# Patient Record
Sex: FEMALE | Race: ASIAN | NOT HISPANIC OR LATINO | Employment: FULL TIME | URBAN - METROPOLITAN AREA
[De-identification: names, ages, dates, MRNs, and addresses within clinical notes are randomized per-mention and may not be internally consistent; named-entity substitution may affect disease eponyms.]

---

## 2017-02-20 ENCOUNTER — OFFICE VISIT - HEALTHEAST (OUTPATIENT)
Dept: FAMILY MEDICINE | Facility: CLINIC | Age: 22
End: 2017-02-20

## 2017-02-20 DIAGNOSIS — E89.0 OTHER POSTABLATIVE HYPOTHYROIDISM: ICD-10-CM

## 2017-02-20 DIAGNOSIS — E05.00 TOXIC DIFFUSE GOITER WITHOUT MENTION OF THYROTOXIC CRISIS OR STORM: ICD-10-CM

## 2017-03-23 ENCOUNTER — AMBULATORY - HEALTHEAST (OUTPATIENT)
Dept: LAB | Facility: CLINIC | Age: 22
End: 2017-03-23

## 2017-03-23 DIAGNOSIS — E89.0 OTHER POSTABLATIVE HYPOTHYROIDISM: ICD-10-CM

## 2017-03-30 ENCOUNTER — COMMUNICATION - HEALTHEAST (OUTPATIENT)
Dept: FAMILY MEDICINE | Facility: CLINIC | Age: 22
End: 2017-03-30

## 2017-03-30 DIAGNOSIS — E03.9 HYPOTHYROIDISM: ICD-10-CM

## 2017-03-30 DIAGNOSIS — E89.0 OTHER POSTABLATIVE HYPOTHYROIDISM: ICD-10-CM

## 2017-07-04 ENCOUNTER — COMMUNICATION - HEALTHEAST (OUTPATIENT)
Dept: FAMILY MEDICINE | Facility: CLINIC | Age: 22
End: 2017-07-04

## 2017-07-04 DIAGNOSIS — E89.0 OTHER POSTABLATIVE HYPOTHYROIDISM: ICD-10-CM

## 2017-09-29 ENCOUNTER — COMMUNICATION - HEALTHEAST (OUTPATIENT)
Dept: FAMILY MEDICINE | Facility: CLINIC | Age: 22
End: 2017-09-29

## 2017-09-29 DIAGNOSIS — E03.9 HYPOTHYROID: ICD-10-CM

## 2017-11-11 ENCOUNTER — COMMUNICATION - HEALTHEAST (OUTPATIENT)
Dept: FAMILY MEDICINE | Facility: CLINIC | Age: 22
End: 2017-11-11

## 2017-11-11 DIAGNOSIS — E03.9 HYPOTHYROID: ICD-10-CM

## 2017-12-02 ENCOUNTER — COMMUNICATION - HEALTHEAST (OUTPATIENT)
Dept: FAMILY MEDICINE | Facility: CLINIC | Age: 22
End: 2017-12-02

## 2017-12-02 DIAGNOSIS — E03.9 HYPOTHYROID: ICD-10-CM

## 2018-02-02 ENCOUNTER — COMMUNICATION - HEALTHEAST (OUTPATIENT)
Dept: FAMILY MEDICINE | Facility: CLINIC | Age: 23
End: 2018-02-02

## 2018-02-02 DIAGNOSIS — E03.9 HYPOTHYROID: ICD-10-CM

## 2018-05-11 ENCOUNTER — COMMUNICATION - HEALTHEAST (OUTPATIENT)
Dept: FAMILY MEDICINE | Facility: CLINIC | Age: 23
End: 2018-05-11

## 2018-05-11 DIAGNOSIS — E03.9 HYPOTHYROID: ICD-10-CM

## 2018-05-15 ENCOUNTER — COMMUNICATION - HEALTHEAST (OUTPATIENT)
Dept: FAMILY MEDICINE | Facility: CLINIC | Age: 23
End: 2018-05-15

## 2018-05-15 DIAGNOSIS — E03.9 HYPOTHYROID: ICD-10-CM

## 2018-06-11 ENCOUNTER — COMMUNICATION - HEALTHEAST (OUTPATIENT)
Dept: FAMILY MEDICINE | Facility: CLINIC | Age: 23
End: 2018-06-11

## 2018-06-11 DIAGNOSIS — E03.9 HYPOTHYROID: ICD-10-CM

## 2018-07-10 ENCOUNTER — COMMUNICATION - HEALTHEAST (OUTPATIENT)
Dept: FAMILY MEDICINE | Facility: CLINIC | Age: 23
End: 2018-07-10

## 2018-07-10 DIAGNOSIS — E03.9 HYPOTHYROID: ICD-10-CM

## 2018-07-23 ENCOUNTER — OFFICE VISIT - HEALTHEAST (OUTPATIENT)
Dept: FAMILY MEDICINE | Facility: CLINIC | Age: 23
End: 2018-07-23

## 2018-07-23 DIAGNOSIS — E03.9 HYPOTHYROIDISM: ICD-10-CM

## 2018-07-23 LAB
T4 FREE SERPL-MCNC: 0.8 NG/DL (ref 0.7–1.8)
TSH SERPL DL<=0.005 MIU/L-ACNC: 27.2 UIU/ML (ref 0.3–5)

## 2018-07-25 ENCOUNTER — COMMUNICATION - HEALTHEAST (OUTPATIENT)
Dept: FAMILY MEDICINE | Facility: CLINIC | Age: 23
End: 2018-07-25

## 2018-07-25 ENCOUNTER — AMBULATORY - HEALTHEAST (OUTPATIENT)
Dept: FAMILY MEDICINE | Facility: CLINIC | Age: 23
End: 2018-07-25

## 2018-07-25 DIAGNOSIS — E03.0 CONGENITAL HYPOTHYROIDISM WITH DIFFUSE GOITER: ICD-10-CM

## 2018-07-25 DIAGNOSIS — E03.9 HYPOTHYROIDISM: ICD-10-CM

## 2018-07-25 DIAGNOSIS — E03.9 HYPOTHYROID: ICD-10-CM

## 2018-10-20 ENCOUNTER — COMMUNICATION - HEALTHEAST (OUTPATIENT)
Dept: FAMILY MEDICINE | Facility: CLINIC | Age: 23
End: 2018-10-20

## 2018-10-20 DIAGNOSIS — E03.9 HYPOTHYROIDISM: ICD-10-CM

## 2018-12-02 ENCOUNTER — COMMUNICATION - HEALTHEAST (OUTPATIENT)
Dept: FAMILY MEDICINE | Facility: CLINIC | Age: 23
End: 2018-12-02

## 2019-01-02 ENCOUNTER — COMMUNICATION - HEALTHEAST (OUTPATIENT)
Dept: FAMILY MEDICINE | Facility: CLINIC | Age: 24
End: 2019-01-02

## 2019-01-16 ENCOUNTER — COMMUNICATION - HEALTHEAST (OUTPATIENT)
Dept: FAMILY MEDICINE | Facility: CLINIC | Age: 24
End: 2019-01-16

## 2019-01-21 ENCOUNTER — OFFICE VISIT - HEALTHEAST (OUTPATIENT)
Dept: FAMILY MEDICINE | Facility: CLINIC | Age: 24
End: 2019-01-21

## 2019-01-21 DIAGNOSIS — E03.9 ACQUIRED HYPOTHYROIDISM: ICD-10-CM

## 2019-01-21 DIAGNOSIS — Z23 ENCOUNTER FOR ADMINISTRATION OF VACCINE: ICD-10-CM

## 2019-01-21 LAB
T4 FREE SERPL-MCNC: 0.9 NG/DL (ref 0.7–1.8)
TSH SERPL DL<=0.005 MIU/L-ACNC: 3.65 UIU/ML (ref 0.3–5)

## 2019-02-05 ENCOUNTER — COMMUNICATION - HEALTHEAST (OUTPATIENT)
Dept: SCHEDULING | Facility: CLINIC | Age: 24
End: 2019-02-05

## 2019-02-06 ENCOUNTER — OFFICE VISIT - HEALTHEAST (OUTPATIENT)
Dept: FAMILY MEDICINE | Facility: CLINIC | Age: 24
End: 2019-02-06

## 2019-02-06 DIAGNOSIS — M62.838 MUSCLE SPASM: ICD-10-CM

## 2019-02-23 ENCOUNTER — COMMUNICATION - HEALTHEAST (OUTPATIENT)
Dept: FAMILY MEDICINE | Facility: CLINIC | Age: 24
End: 2019-02-23

## 2019-02-23 DIAGNOSIS — M62.838 MUSCLE SPASM: ICD-10-CM

## 2019-07-08 ENCOUNTER — OFFICE VISIT - HEALTHEAST (OUTPATIENT)
Dept: FAMILY MEDICINE | Facility: CLINIC | Age: 24
End: 2019-07-08

## 2019-07-08 DIAGNOSIS — T07.XXXA MULTIPLE ABRASIONS: ICD-10-CM

## 2019-07-08 DIAGNOSIS — M25.572 PAIN IN JOINT INVOLVING ANKLE AND FOOT, LEFT: ICD-10-CM

## 2019-07-08 DIAGNOSIS — S93.402A SPRAIN OF LEFT ANKLE, UNSPECIFIED LIGAMENT, INITIAL ENCOUNTER: ICD-10-CM

## 2019-07-24 ENCOUNTER — COMMUNICATION - HEALTHEAST (OUTPATIENT)
Dept: FAMILY MEDICINE | Facility: CLINIC | Age: 24
End: 2019-07-24

## 2019-12-24 ENCOUNTER — COMMUNICATION - HEALTHEAST (OUTPATIENT)
Dept: FAMILY MEDICINE | Facility: CLINIC | Age: 24
End: 2019-12-24

## 2020-01-03 ENCOUNTER — OFFICE VISIT - HEALTHEAST (OUTPATIENT)
Dept: FAMILY MEDICINE | Facility: CLINIC | Age: 25
End: 2020-01-03

## 2020-01-03 DIAGNOSIS — N91.2 AMENORRHEA: ICD-10-CM

## 2020-01-03 DIAGNOSIS — Z86.39 HX OF GRAVES' DISEASE: ICD-10-CM

## 2020-01-03 DIAGNOSIS — Z32.01 PREGNANCY TEST POSITIVE: ICD-10-CM

## 2020-01-03 DIAGNOSIS — E89.0 POSTPROCEDURAL HYPOTHYROIDISM: ICD-10-CM

## 2020-01-03 LAB
HCG UR QL: POSITIVE
T4 FREE SERPL-MCNC: 1.3 NG/DL (ref 0.7–1.8)
TSH SERPL DL<=0.005 MIU/L-ACNC: 1.29 UIU/ML (ref 0.3–5)

## 2020-01-06 LAB
C TRACH DNA SPEC QL PROBE+SIG AMP: NEGATIVE
N GONORRHOEA DNA SPEC QL NAA+PROBE: NEGATIVE

## 2020-01-07 LAB — THYROID PEROXIDASE ANTIBODIES - HISTORICAL: 11.5 IU/ML (ref 0–5.6)

## 2020-01-09 ENCOUNTER — COMMUNICATION - HEALTHEAST (OUTPATIENT)
Dept: FAMILY MEDICINE | Facility: CLINIC | Age: 25
End: 2020-01-09

## 2020-01-09 DIAGNOSIS — Z32.01 PREGNANCY TEST POSITIVE: ICD-10-CM

## 2020-01-10 ENCOUNTER — HOSPITAL ENCOUNTER (OUTPATIENT)
Dept: ULTRASOUND IMAGING | Facility: CLINIC | Age: 25
Discharge: HOME | End: 2020-01-10
Attending: FAMILY MEDICINE

## 2020-01-27 ENCOUNTER — COMMUNICATION - HEALTHEAST (OUTPATIENT)
Dept: FAMILY MEDICINE | Facility: CLINIC | Age: 25
End: 2020-01-27

## 2020-01-27 DIAGNOSIS — E03.9 ACQUIRED HYPOTHYROIDISM: ICD-10-CM

## 2020-02-19 ENCOUNTER — COMMUNICATION - HEALTHEAST (OUTPATIENT)
Dept: FAMILY MEDICINE | Facility: CLINIC | Age: 25
End: 2020-02-19

## 2020-03-09 ENCOUNTER — PRENATAL OFFICE VISIT - HEALTHEAST (OUTPATIENT)
Dept: FAMILY MEDICINE | Facility: CLINIC | Age: 25
End: 2020-03-09

## 2020-03-09 DIAGNOSIS — Z12.4 SCREENING FOR CERVICAL CANCER: ICD-10-CM

## 2020-03-09 DIAGNOSIS — E89.0 POSTPROCEDURAL HYPOTHYROIDISM: ICD-10-CM

## 2020-03-09 DIAGNOSIS — Z86.39 HX OF GRAVES' DISEASE: ICD-10-CM

## 2020-03-09 DIAGNOSIS — Z34.82 ENCOUNTER FOR SUPERVISION OF OTHER NORMAL PREGNANCY IN SECOND TRIMESTER: ICD-10-CM

## 2020-03-09 LAB
ALBUMIN UR-MCNC: NEGATIVE MG/DL
AMPHETAMINES UR QL SCN: NORMAL
APPEARANCE UR: CLEAR
BARBITURATES UR QL: NORMAL
BASOPHILS # BLD AUTO: 0.1 THOU/UL (ref 0–0.2)
BASOPHILS NFR BLD AUTO: 1 % (ref 0–2)
BENZODIAZ UR QL: NORMAL
BILIRUB UR QL STRIP: NEGATIVE
CANNABINOIDS UR QL SCN: NORMAL
COCAINE UR QL: NORMAL
COLOR UR AUTO: YELLOW
CREAT UR-MCNC: 36.6 MG/DL
EOSINOPHIL # BLD AUTO: 0.3 THOU/UL (ref 0–0.4)
EOSINOPHIL NFR BLD AUTO: 2 % (ref 0–6)
ERYTHROCYTE [DISTWIDTH] IN BLOOD BY AUTOMATED COUNT: 14.3 % (ref 11–14.5)
GLUCOSE UR STRIP-MCNC: NEGATIVE MG/DL
HCT VFR BLD AUTO: 39 % (ref 35–47)
HGB BLD-MCNC: 12.7 G/DL (ref 12–16)
HGB UR QL STRIP: ABNORMAL
HIV 1+2 AB+HIV1 P24 AG SERPL QL IA: NEGATIVE
KETONES UR STRIP-MCNC: NEGATIVE MG/DL
LEUKOCYTE ESTERASE UR QL STRIP: NEGATIVE
LYMPHOCYTES # BLD AUTO: 1.2 THOU/UL (ref 0.8–4.4)
LYMPHOCYTES NFR BLD AUTO: 10 % (ref 20–40)
MCH RBC QN AUTO: 30.5 PG (ref 27–34)
MCHC RBC AUTO-ENTMCNC: 32.6 G/DL (ref 32–36)
MCV RBC AUTO: 94 FL (ref 80–100)
MONOCYTES # BLD AUTO: 0.8 THOU/UL (ref 0–0.9)
MONOCYTES NFR BLD AUTO: 6 % (ref 2–10)
NEUTROPHILS # BLD AUTO: 9.8 THOU/UL (ref 2–7.7)
NEUTROPHILS NFR BLD AUTO: 79 % (ref 50–70)
NITRATE UR QL: NEGATIVE
OPIATES UR QL SCN: NORMAL
OXYCODONE UR QL: NORMAL
PCP UR QL SCN: NORMAL
PH UR STRIP: 7 [PH] (ref 5–8)
PLATELET # BLD AUTO: 259 THOU/UL (ref 140–440)
PMV BLD AUTO: 10.8 FL (ref 8.5–12.5)
RBC # BLD AUTO: 4.16 MILL/UL (ref 3.8–5.4)
SP GR UR STRIP: 1.01 (ref 1–1.03)
T4 FREE SERPL-MCNC: 0.9 NG/DL (ref 0.7–1.8)
TSH SERPL DL<=0.005 MIU/L-ACNC: 17.6 UIU/ML (ref 0.3–5)
UROBILINOGEN UR STRIP-ACNC: ABNORMAL
WBC: 12.4 THOU/UL (ref 4–11)

## 2020-03-10 ENCOUNTER — HOSPITAL ENCOUNTER (OUTPATIENT)
Dept: ULTRASOUND IMAGING | Facility: HOSPITAL | Age: 25
Discharge: HOME | End: 2020-03-10
Attending: FAMILY MEDICINE

## 2020-03-10 ENCOUNTER — AMBULATORY - HEALTHEAST (OUTPATIENT)
Dept: FAMILY MEDICINE | Facility: CLINIC | Age: 25
End: 2020-03-10

## 2020-03-10 DIAGNOSIS — Z34.82 ENCOUNTER FOR SUPERVISION OF OTHER NORMAL PREGNANCY IN SECOND TRIMESTER: ICD-10-CM

## 2020-03-10 DIAGNOSIS — E03.9 ACQUIRED HYPOTHYROIDISM: ICD-10-CM

## 2020-03-10 LAB
ABO/RH(D): NORMAL
ABORH REPEAT: NORMAL
ANTIBODY SCREEN: NEGATIVE
BACTERIA SPEC CULT: NO GROWTH
HBV SURFACE AG SERPL QL IA: NEGATIVE
RUBV IGG SERPL QL IA: POSITIVE
T PALLIDUM AB SER QL: NEGATIVE

## 2020-03-11 ENCOUNTER — COMMUNICATION - HEALTHEAST (OUTPATIENT)
Dept: FAMILY MEDICINE | Facility: CLINIC | Age: 25
End: 2020-03-11

## 2020-03-12 ENCOUNTER — COMMUNICATION - HEALTHEAST (OUTPATIENT)
Dept: FAMILY MEDICINE | Facility: CLINIC | Age: 25
End: 2020-03-12

## 2020-03-12 LAB
BKR LAB AP ABNORMAL BLEEDING: NO
BKR LAB AP BIRTH CONTROL/HORMONES: ABNORMAL
BKR LAB AP CERVICAL APPEARANCE: ABNORMAL
BKR LAB AP GYN ADEQUACY: ABNORMAL
BKR LAB AP GYN INTERPRETATION: ABNORMAL
BKR LAB AP HPV REFLEX: ABNORMAL
BKR LAB AP LMP: ABNORMAL
BKR LAB AP PATIENT STATUS: ABNORMAL
BKR LAB AP PREVIOUS ABNORMAL: ABNORMAL
BKR LAB AP PREVIOUS NORMAL: 2010
HIGH RISK?: NO
HPV SOURCE: NORMAL
HUMAN PAPILLOMA VIRUS 16 DNA: NEGATIVE
HUMAN PAPILLOMA VIRUS 18 DNA: NEGATIVE
HUMAN PAPILLOMA VIRUS FINAL DIAGNOSIS: NORMAL
HUMAN PAPILLOMA VIRUS OTHER HR: NEGATIVE
PATH REPORT.COMMENTS IMP SPEC: ABNORMAL
RESULT FLAG (HE HISTORICAL CONVERSION): ABNORMAL
SPECIMEN DESCRIPTION: NORMAL

## 2020-03-17 ENCOUNTER — COMMUNICATION - HEALTHEAST (OUTPATIENT)
Dept: FAMILY MEDICINE | Facility: CLINIC | Age: 25
End: 2020-03-17

## 2020-03-18 ENCOUNTER — COMMUNICATION - HEALTHEAST (OUTPATIENT)
Dept: FAMILY MEDICINE | Facility: CLINIC | Age: 25
End: 2020-03-18

## 2020-03-21 ENCOUNTER — AMBULATORY - HEALTHEAST (OUTPATIENT)
Dept: FAMILY MEDICINE | Facility: CLINIC | Age: 25
End: 2020-03-21

## 2020-03-21 DIAGNOSIS — Z34.82 ENCOUNTER FOR SUPERVISION OF OTHER NORMAL PREGNANCY IN SECOND TRIMESTER: ICD-10-CM

## 2020-03-23 ENCOUNTER — COMMUNICATION - HEALTHEAST (OUTPATIENT)
Dept: FAMILY MEDICINE | Facility: CLINIC | Age: 25
End: 2020-03-23

## 2020-03-26 ENCOUNTER — COMMUNICATION - HEALTHEAST (OUTPATIENT)
Dept: FAMILY MEDICINE | Facility: CLINIC | Age: 25
End: 2020-03-26

## 2020-03-30 ENCOUNTER — COMMUNICATION - HEALTHEAST (OUTPATIENT)
Dept: FAMILY MEDICINE | Facility: CLINIC | Age: 25
End: 2020-03-30

## 2020-03-30 DIAGNOSIS — E03.9 ACQUIRED HYPOTHYROIDISM: ICD-10-CM

## 2020-04-02 ENCOUNTER — HOSPITAL ENCOUNTER (OUTPATIENT)
Dept: ULTRASOUND IMAGING | Facility: HOSPITAL | Age: 25
Discharge: HOME | End: 2020-04-02
Attending: FAMILY MEDICINE

## 2020-04-02 DIAGNOSIS — Z34.82 ENCOUNTER FOR SUPERVISION OF OTHER NORMAL PREGNANCY IN SECOND TRIMESTER: ICD-10-CM

## 2020-04-09 ENCOUNTER — PRENATAL OFFICE VISIT - HEALTHEAST (OUTPATIENT)
Dept: FAMILY MEDICINE | Facility: CLINIC | Age: 25
End: 2020-04-09

## 2020-04-09 ENCOUNTER — COMMUNICATION - HEALTHEAST (OUTPATIENT)
Dept: FAMILY MEDICINE | Facility: CLINIC | Age: 25
End: 2020-04-09

## 2020-04-09 DIAGNOSIS — E89.0 POSTPROCEDURAL HYPOTHYROIDISM: ICD-10-CM

## 2020-04-09 DIAGNOSIS — R87.612 LOW GRADE SQUAMOUS INTRAEPITH LESION ON CYTOLOGIC SMEAR CERVIX (LGSIL): ICD-10-CM

## 2020-04-09 DIAGNOSIS — Z34.80 SUPERVISION OF OTHER NORMAL PREGNANCY, ANTEPARTUM: ICD-10-CM

## 2020-04-10 ENCOUNTER — COMMUNICATION - HEALTHEAST (OUTPATIENT)
Dept: FAMILY MEDICINE | Facility: CLINIC | Age: 25
End: 2020-04-10

## 2020-04-20 ENCOUNTER — COMMUNICATION - HEALTHEAST (OUTPATIENT)
Dept: FAMILY MEDICINE | Facility: CLINIC | Age: 25
End: 2020-04-20

## 2020-04-21 ENCOUNTER — COMMUNICATION - HEALTHEAST (OUTPATIENT)
Dept: FAMILY MEDICINE | Facility: CLINIC | Age: 25
End: 2020-04-21

## 2020-04-21 DIAGNOSIS — E03.9 ACQUIRED HYPOTHYROIDISM: ICD-10-CM

## 2020-05-12 ENCOUNTER — PRENATAL OFFICE VISIT - HEALTHEAST (OUTPATIENT)
Dept: FAMILY MEDICINE | Facility: CLINIC | Age: 25
End: 2020-05-12

## 2020-05-12 DIAGNOSIS — E03.9 ACQUIRED HYPOTHYROIDISM: ICD-10-CM

## 2020-05-12 DIAGNOSIS — E89.0 POSTPROCEDURAL HYPOTHYROIDISM: ICD-10-CM

## 2020-05-12 DIAGNOSIS — Z34.80 SUPERVISION OF OTHER NORMAL PREGNANCY, ANTEPARTUM: ICD-10-CM

## 2020-05-12 LAB
ERYTHROCYTE [DISTWIDTH] IN BLOOD BY AUTOMATED COUNT: 10.7 % (ref 11–14.5)
FASTING STATUS PATIENT QL REPORTED: NORMAL
GLUCOSE 1H P 50 G GLC PO SERPL-MCNC: 101 MG/DL (ref 70–139)
HCT VFR BLD AUTO: 36.6 % (ref 35–47)
HGB BLD-MCNC: 12.2 G/DL (ref 12–16)
MCH RBC QN AUTO: 32.1 PG (ref 27–34)
MCHC RBC AUTO-ENTMCNC: 33.3 G/DL (ref 32–36)
MCV RBC AUTO: 96 FL (ref 80–100)
PLATELET # BLD AUTO: 264 THOU/UL (ref 140–440)
PMV BLD AUTO: 7.6 FL (ref 7–10)
RBC # BLD AUTO: 3.8 MILL/UL (ref 3.8–5.4)
TSH SERPL DL<=0.005 MIU/L-ACNC: 0.99 UIU/ML (ref 0.3–5)
WBC: 14.4 THOU/UL (ref 4–11)

## 2020-05-13 LAB — T PALLIDUM AB SER QL: NEGATIVE

## 2020-05-20 ENCOUNTER — COMMUNICATION - HEALTHEAST (OUTPATIENT)
Dept: FAMILY MEDICINE | Facility: CLINIC | Age: 25
End: 2020-05-20

## 2020-05-20 DIAGNOSIS — E03.9 ACQUIRED HYPOTHYROIDISM: ICD-10-CM

## 2020-05-28 ENCOUNTER — COMMUNICATION - HEALTHEAST (OUTPATIENT)
Dept: FAMILY MEDICINE | Facility: CLINIC | Age: 25
End: 2020-05-28

## 2020-05-28 ENCOUNTER — OFFICE VISIT - HEALTHEAST (OUTPATIENT)
Dept: FAMILY MEDICINE | Facility: CLINIC | Age: 25
End: 2020-05-28

## 2020-05-28 DIAGNOSIS — E03.9 ACQUIRED HYPOTHYROIDISM: ICD-10-CM

## 2020-05-28 DIAGNOSIS — R87.612 LOW GRADE SQUAMOUS INTRAEPITH LESION ON CYTOLOGIC SMEAR CERVIX (LGSIL): ICD-10-CM

## 2020-05-28 DIAGNOSIS — Z34.83 ENCOUNTER FOR SUPERVISION OF OTHER NORMAL PREGNANCY IN THIRD TRIMESTER: ICD-10-CM

## 2020-05-28 DIAGNOSIS — E89.0 POSTPROCEDURAL HYPOTHYROIDISM: ICD-10-CM

## 2020-06-11 ENCOUNTER — PRENATAL OFFICE VISIT - HEALTHEAST (OUTPATIENT)
Dept: FAMILY MEDICINE | Facility: CLINIC | Age: 25
End: 2020-06-11

## 2020-06-11 DIAGNOSIS — Z34.83 ENCOUNTER FOR SUPERVISION OF OTHER NORMAL PREGNANCY IN THIRD TRIMESTER: ICD-10-CM

## 2020-06-11 DIAGNOSIS — E03.9 ACQUIRED HYPOTHYROIDISM: ICD-10-CM

## 2020-06-11 LAB — TSH SERPL DL<=0.005 MIU/L-ACNC: 1.82 UIU/ML (ref 0.3–5)

## 2020-06-17 ENCOUNTER — HOSPITAL ENCOUNTER (OUTPATIENT)
Dept: ULTRASOUND IMAGING | Facility: HOSPITAL | Age: 25
Discharge: HOME | End: 2020-06-17
Attending: FAMILY MEDICINE

## 2020-06-17 DIAGNOSIS — E03.9 ACQUIRED HYPOTHYROIDISM: ICD-10-CM

## 2020-06-17 DIAGNOSIS — Z34.83 ENCOUNTER FOR SUPERVISION OF OTHER NORMAL PREGNANCY IN THIRD TRIMESTER: ICD-10-CM

## 2020-06-21 ENCOUNTER — COMMUNICATION - HEALTHEAST (OUTPATIENT)
Dept: FAMILY MEDICINE | Facility: CLINIC | Age: 25
End: 2020-06-21

## 2020-06-21 DIAGNOSIS — E03.9 ACQUIRED HYPOTHYROIDISM: ICD-10-CM

## 2020-06-22 RX ORDER — LEVOTHYROXINE SODIUM 125 UG/1
TABLET ORAL
Qty: 90 TABLET | Refills: 3 | Status: SHIPPED | OUTPATIENT
Start: 2020-06-22 | End: 2022-05-31

## 2020-06-25 ENCOUNTER — PRENATAL OFFICE VISIT - HEALTHEAST (OUTPATIENT)
Dept: FAMILY MEDICINE | Facility: CLINIC | Age: 25
End: 2020-06-25

## 2020-06-25 DIAGNOSIS — Z34.80 SUPERVISION OF OTHER NORMAL PREGNANCY, ANTEPARTUM: ICD-10-CM

## 2020-06-25 DIAGNOSIS — E89.0 POSTPROCEDURAL HYPOTHYROIDISM: ICD-10-CM

## 2020-07-02 ENCOUNTER — PRENATAL OFFICE VISIT - HEALTHEAST (OUTPATIENT)
Dept: FAMILY MEDICINE | Facility: CLINIC | Age: 25
End: 2020-07-02

## 2020-07-02 DIAGNOSIS — Z34.80 SUPERVISION OF OTHER NORMAL PREGNANCY, ANTEPARTUM: ICD-10-CM

## 2020-07-03 LAB
ALLERGIC TO PENICILLIN: NO
GP B STREP DNA SPEC QL NAA+PROBE: POSITIVE

## 2020-07-16 ENCOUNTER — PRENATAL OFFICE VISIT - HEALTHEAST (OUTPATIENT)
Dept: FAMILY MEDICINE | Facility: CLINIC | Age: 25
End: 2020-07-16

## 2020-07-16 DIAGNOSIS — Z34.80 SUPERVISION OF OTHER NORMAL PREGNANCY, ANTEPARTUM: ICD-10-CM

## 2020-07-16 DIAGNOSIS — B95.1 POSITIVE GBS TEST: ICD-10-CM

## 2020-07-16 DIAGNOSIS — E89.0 POSTPROCEDURAL HYPOTHYROIDISM: ICD-10-CM

## 2020-07-16 LAB — TSH SERPL DL<=0.005 MIU/L-ACNC: 0.77 UIU/ML (ref 0.3–5)

## 2020-07-21 ENCOUNTER — HOSPITAL ENCOUNTER (OUTPATIENT)
Dept: ULTRASOUND IMAGING | Facility: HOSPITAL | Age: 25
Discharge: HOME | End: 2020-07-21
Attending: FAMILY MEDICINE

## 2020-07-21 DIAGNOSIS — Z34.80 SUPERVISION OF OTHER NORMAL PREGNANCY, ANTEPARTUM: ICD-10-CM

## 2020-07-21 DIAGNOSIS — E89.0 POSTPROCEDURAL HYPOTHYROIDISM: ICD-10-CM

## 2020-07-23 ENCOUNTER — PRENATAL OFFICE VISIT - HEALTHEAST (OUTPATIENT)
Dept: FAMILY MEDICINE | Facility: CLINIC | Age: 25
End: 2020-07-23

## 2020-07-23 DIAGNOSIS — Z34.80 SUPERVISION OF OTHER NORMAL PREGNANCY, ANTEPARTUM: ICD-10-CM

## 2020-07-23 DIAGNOSIS — E89.0 POSTPROCEDURAL HYPOTHYROIDISM: ICD-10-CM

## 2020-07-23 DIAGNOSIS — B95.1 POSITIVE GBS TEST: ICD-10-CM

## 2020-07-24 ENCOUNTER — COMMUNICATION - HEALTHEAST (OUTPATIENT)
Dept: FAMILY MEDICINE | Facility: CLINIC | Age: 25
End: 2020-07-24

## 2020-07-30 ENCOUNTER — PRENATAL OFFICE VISIT - HEALTHEAST (OUTPATIENT)
Dept: FAMILY MEDICINE | Facility: CLINIC | Age: 25
End: 2020-07-30

## 2020-07-30 DIAGNOSIS — E89.0 POSTPROCEDURAL HYPOTHYROIDISM: ICD-10-CM

## 2020-07-30 DIAGNOSIS — B95.1 POSITIVE GBS TEST: ICD-10-CM

## 2020-07-30 DIAGNOSIS — Z34.83 ENCOUNTER FOR SUPERVISION OF OTHER NORMAL PREGNANCY IN THIRD TRIMESTER: ICD-10-CM

## 2020-08-03 ENCOUNTER — ANESTHESIA - HEALTHEAST (OUTPATIENT)
Dept: OBGYN | Facility: CLINIC | Age: 25
End: 2020-08-03

## 2020-08-03 ENCOUNTER — COMMUNICATION - HEALTHEAST (OUTPATIENT)
Dept: SCHEDULING | Facility: CLINIC | Age: 25
End: 2020-08-03

## 2020-08-06 ENCOUNTER — COMMUNICATION - HEALTHEAST (OUTPATIENT)
Dept: SCHEDULING | Facility: CLINIC | Age: 25
End: 2020-08-06

## 2020-09-16 ENCOUNTER — COMMUNICATION - HEALTHEAST (OUTPATIENT)
Dept: FAMILY MEDICINE | Facility: CLINIC | Age: 25
End: 2020-09-16

## 2021-05-07 ENCOUNTER — AMBULATORY - HEALTHEAST (OUTPATIENT)
Dept: NURSING | Facility: CLINIC | Age: 26
End: 2021-05-07

## 2021-05-27 ENCOUNTER — RECORDS - HEALTHEAST (OUTPATIENT)
Dept: ADMINISTRATIVE | Facility: CLINIC | Age: 26
End: 2021-05-27

## 2021-05-28 ENCOUNTER — AMBULATORY - HEALTHEAST (OUTPATIENT)
Dept: NURSING | Facility: CLINIC | Age: 26
End: 2021-05-28

## 2021-05-30 VITALS — BODY MASS INDEX: 30.67 KG/M2 | WEIGHT: 165 LBS

## 2021-05-30 NOTE — TELEPHONE ENCOUNTER
Name of form/paperwork: [place]  Have you been seen for this request: No Patient was unable to see Dr. Abdi on 7/8/19 so she was seen in [place] [facility].  Do we have the form: Yes- faxed to [phone], today.  When is form needed by: Today, if possible  How would you like the form returned: Please fax back to patient's employer.  Fax Number: [phone]  Patient Notified form requests are processed in 3-5 business days: Yes  (If patient needs form sooner, please note that in this message.)  Okay to leave a detailed message? Yes [phone]

## 2021-05-31 NOTE — TELEPHONE ENCOUNTER
Left message to call back for: Kenia  Information to relay to patient:     has not yet received any forms for the patient.

## 2021-06-01 VITALS — BODY MASS INDEX: 31.79 KG/M2 | WEIGHT: 171 LBS

## 2021-06-02 VITALS — BODY MASS INDEX: 31.47 KG/M2 | WEIGHT: 169.3 LBS

## 2021-06-02 VITALS — WEIGHT: 173.56 LBS | BODY MASS INDEX: 32.26 KG/M2

## 2021-06-03 VITALS — WEIGHT: 156.13 LBS | BODY MASS INDEX: 29.02 KG/M2

## 2021-06-04 VITALS
BODY MASS INDEX: 27.83 KG/M2 | WEIGHT: 149.7 LBS | SYSTOLIC BLOOD PRESSURE: 120 MMHG | DIASTOLIC BLOOD PRESSURE: 64 MMHG | HEART RATE: 80 BPM

## 2021-06-04 VITALS
HEART RATE: 96 BPM | DIASTOLIC BLOOD PRESSURE: 58 MMHG | BODY MASS INDEX: 28.39 KG/M2 | SYSTOLIC BLOOD PRESSURE: 106 MMHG | WEIGHT: 152.7 LBS

## 2021-06-04 VITALS
WEIGHT: 154.7 LBS | DIASTOLIC BLOOD PRESSURE: 56 MMHG | HEART RATE: 72 BPM | SYSTOLIC BLOOD PRESSURE: 92 MMHG | BODY MASS INDEX: 28.76 KG/M2

## 2021-06-04 VITALS
SYSTOLIC BLOOD PRESSURE: 102 MMHG | BODY MASS INDEX: 29.54 KG/M2 | DIASTOLIC BLOOD PRESSURE: 58 MMHG | HEART RATE: 84 BPM | WEIGHT: 158.9 LBS

## 2021-06-04 VITALS
WEIGHT: 158 LBS | DIASTOLIC BLOOD PRESSURE: 58 MMHG | HEART RATE: 88 BPM | BODY MASS INDEX: 29.37 KG/M2 | SYSTOLIC BLOOD PRESSURE: 100 MMHG

## 2021-06-04 VITALS
SYSTOLIC BLOOD PRESSURE: 112 MMHG | BODY MASS INDEX: 29.82 KG/M2 | DIASTOLIC BLOOD PRESSURE: 52 MMHG | WEIGHT: 160.4 LBS | HEART RATE: 88 BPM

## 2021-06-04 VITALS
SYSTOLIC BLOOD PRESSURE: 98 MMHG | HEART RATE: 84 BPM | WEIGHT: 162.1 LBS | BODY MASS INDEX: 30.13 KG/M2 | DIASTOLIC BLOOD PRESSURE: 58 MMHG

## 2021-06-04 VITALS
DIASTOLIC BLOOD PRESSURE: 56 MMHG | BODY MASS INDEX: 29.17 KG/M2 | SYSTOLIC BLOOD PRESSURE: 94 MMHG | HEART RATE: 84 BPM | WEIGHT: 156.9 LBS

## 2021-06-04 VITALS
BODY MASS INDEX: 29.5 KG/M2 | SYSTOLIC BLOOD PRESSURE: 96 MMHG | DIASTOLIC BLOOD PRESSURE: 48 MMHG | WEIGHT: 158.7 LBS | HEART RATE: 104 BPM

## 2021-06-04 VITALS
DIASTOLIC BLOOD PRESSURE: 52 MMHG | HEART RATE: 80 BPM | BODY MASS INDEX: 28.42 KG/M2 | WEIGHT: 152.9 LBS | SYSTOLIC BLOOD PRESSURE: 96 MMHG

## 2021-06-04 NOTE — TELEPHONE ENCOUNTER
New Appointment Needed  What is the reason for the visit:    Pregnancy Confirmation Appt Needed  When was the first day of your last menstrual cycle?: 11/6/19  Have you done a home pregnancy test?: Yes: Patient had a positive home pregnancy test.    Provider Preference: Any available  How soon do you need to be seen?: Fridays work the best  Waitlist offered?: Yes  Okay to leave a detailed message:  Yes

## 2021-06-04 NOTE — PROGRESS NOTES
Assessment/Plan:  1. Amenorrhea  Chlamydia trachomatis & Neisseria gonorrhoeae, Amplified Detection    Pregnancy (Beta-hCG, Qual), Urine    US OB < 14 Weeks   2. Pregnancy test positive     3. Postprocedural hypothyroidism  Thyroid Stimulating Hormone (TSH)    T4, Free    Thyroid Peroxidase Antibody   4. Hx of Graves' disease      UPT today was positive. No LMP recorded.   - Dating ultrasound ordered given unknown LMP; 8+ weeks along given home UPT +early December  - Prenatal vitamin recommended   - 1st trimester education reviewed: nutrition, smoking, alcohol & drug use and safe medications  - She will set up a first OB appointment at 10-12 weeks gestation.   - All questions that were asked were answered.   - Call or return to clinic if severe cramping or abdominal pain or any vaginal bleeding    Long conversation today regarding her hypothyroidism and should she become pregnant again in the future to notify us right away so we can check labs and adjust accordingly.  She did not increase her dose at home so we will start with laboratory evaluation and go from there.  She will need more frequent monitoring during pregnancy.      CHIEF COMPLAINT;  Chief Complaint   Patient presents with     Confirmation     + home test in beginning of December, unsure LMP, sometimes nausea       HISTORY OF PRESENT ILLNESS:  Kenia is a 24 y.o. female presenting to the clinic today for amenorrhea and pregnancy confirmation.     No LMP recorded.  She is unsure when her last menstrual cycle was. Hx of irregular menstrual cycles.   Home UPT?  Positive at the beginning of December      Contraceptive method previously: none  Menstrual hx: irregular periods  Symptoms of pregnancy: breast tenderness, fatigue, frequent urination and nausea. No vaginal bleeding or abdominal pain since LMP.   If pregnant, pregnancy is: unplanned, desired    She has hypothyroidism (aquired). She has a history of Graves' disease, and she received 13 mCi of  I-131 ablation on 2013.  She is currently taking levothyroxine 112  g daily and feels well.  She has no symptoms of hypo-or hyperthyroidism nor any symptoms referable to her eyes.     Third pregnancy.   2013,  39 and 1 weeks.  Positive group B strep and fever during labor.  She did have IV antibiotics, epidural, AROM, Pitocin, augmentation.  She was on PTU during that pregnancy by chart review.   2011 term pregnancy spontaneous labor, epidural, AROM     Remainder of 12-point ROS is negative.    She is not smoking. No alcohol use.     VITALS:  Vitals:    20 1425   BP: 106/58   Patient Site: Left Arm   Patient Position: Sitting   Cuff Size: Adult Regular   Pulse: 96   Weight: 152 lb 11.2 oz (69.3 kg)     Wt Readings from Last 3 Encounters:   20 152 lb 11.2 oz (69.3 kg)   19 156 lb 2 oz (70.8 kg)   19 169 lb 4.8 oz (76.8 kg)     Body mass index is 28.39 kg/m .    PHYSICAL EXAM:  Constitutional: healthy, alert and no distress  Head: Normocephalic. Atraumatic   Neck: Neck supple. No adenopathy.   Cardiovascular: Regular rate and rhythm. No murmurs, clicks gallops or rub  Respiratory: Lungs clear to auscultation. No wheezing or crackles present   Abdomen:  Abdomen soft, non-tender. BS normal. No masses, organomegaly  Musculoskeletal: extremities normal- no gross deformities noted and normal muscle tone  Skin: no suspicious lesions or rashes  Psychiatric: mentation appears normal and affect normal/bright    RECENT RESULTS  Recent Results (from the past 48 hour(s))   Pregnancy (Beta-hCG, Qual), Urine    Collection Time: 20  2:19 PM   Result Value Ref Range    Pregnancy Test, Urine Positive (!) Negative       MEDICATIONS:  Current Outpatient Medications   Medication Sig Dispense Refill     levothyroxine (SYNTHROID) 112 MCG tablet Take 1 tablet (112 mcg total) by mouth daily. 90 tablet 3     No current facility-administered medications for this visit.

## 2021-06-05 NOTE — TELEPHONE ENCOUNTER
Medication Request    Medication name:   prenatal vit 91-iron-folic-dha (PRENATAL + DHA) 28 mg iron- 975 mcg-200 mg Cmpk    Requested Pharmacy:   [facility]Y #[id number] - [place], [place] - [address]     Reason for request:     Needs alt med, Prenatal plus is the preferred product. Please send over a new script for patient.    When did you use medication last?:  NA    Patient offered appointment:    N/A - electronic request     Okay to leave a detailed message: yes

## 2021-06-05 NOTE — TELEPHONE ENCOUNTER
Refill Approved    Rx renewed per Medication Renewal Policy. Medication was last renewed on 1/22/19.    Vivian Underwood, [facility] Triage/Med Refill 1/27/2020     Requested Prescriptions   Pending Prescriptions Disp Refills     SYNTHROID 112 mcg tablet [Pharmacy Med Name: SYNTHROID 112 MCG TABLET] 90 tablet 3     Sig: TAKE 1 TABLET BY MOUTH EVERY DAY       Thyroid Hormones Protocol Passed - 1/27/2020  9:41 AM        Passed - Provider visit in past 12 months or next 3 months     Last office visit with prescriber/PCP: 1/21/2019 David Chavez MD OR same dept: 1/3/2020 Kenia Valdovinos MD OR same specialty: 1/3/2020 Kenia Valdovinos MD  Last physical: Visit date not found Last MTM visit: Visit date not found   Next visit within 3 mo: Visit date not found  Next physical within 3 mo: Visit date not found  Prescriber OR PCP: David Meza MD  Last diagnosis associated with med order: 1. Acquired hypothyroidism  - SYNTHROID 112 mcg tablet [Pharmacy Med Name: SYNTHROID 112 MCG TABLET]; TAKE 1 TABLET BY MOUTH EVERY DAY  Dispense: 90 tablet; Refill: 3    If protocol passes may refill for 12 months if within 3 months of last provider visit (or a total of 15 months).             Passed - TSH on file in past 12 months for patient age 12 & older     TSH   Date Value Ref Range Status   01/03/2020 1.29 0.30 - 5.00 uIU/mL Final

## 2021-06-06 NOTE — TELEPHONE ENCOUNTER
----- Message from Kenia Valdovinos MD sent at 3/10/2020  9:30 PM CDT -----  Please ensure pt has read the result message about TSH level.     Your first OB labs look great. Your blood type is B positive. You will not need Rhogam this pregnancy.   Your TSH thyroid level is elevated. We should increase your Synthroid dose from 112mcg daily to 125mcg daily. I sent in a new prescription and I would like to recheck your labs in about 4 weeks, at your next OB appointment.   Thanks,   Kenia Valdovinos MD

## 2021-06-06 NOTE — TELEPHONE ENCOUNTER
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 The recommendation since you are pregnant is to wait and repeat the pap with a colposcopy procedure after 6 weeks post partum. We can talk more about this at your next OB visit on 4/9/2020.     Your TSH thyroid level is elevated. We should increase your Synthroid dose from 112mcg daily to 125mcg daily    Also, please see your ultrasound report with my message there- we had tried to call you with these results as well. Since the baby's spine wasn't fully visualized we recommend repeating this ultrasound in about 2 weeks. I will place this order for you to have done before our next appointment.   Thanks,  Kenia Valdovinos MD

## 2021-06-06 NOTE — PATIENT INSTRUCTIONS - HE
"  Patient Education   HEALTHY PREGNANCY CARE: 14 to 18 WEEKS PREGNANT    During this time, you may start to \"show,\" so that you look pregnant to people around you. You may also notice some changes in your skin, such as an increase in acne on your face. You may notice your heart pounding, a sharp stretching ache on either side of your lower abdomen (round ligament), and more vaginal discharge.     Your baby's nerves and muscles are maturing. Sex organs are recognizable. Your baby is now able to pass urine, and your baby's first stool (meconium) is starting to collect in his or her intestines. Hair is also beginning to grow on your baby's head. Your baby is moving freely inside your uterus but you may not be able to feel it until 18-20 weeks.    Continue making healthy choices for your baby during pregnancy, including good nutrition, exercise and a safe environment free from smoking, alcohol and drugs.    Your genetic screening with a quad screen blood test may have been done today.    Watch for warning signs and contact your midwife or physician at the clinic with any concerns at [facility]/OB at Phone: [phone]. For example, call about cramping, bleeding, abdominal pain, watery vaginal discharge, or if you are unable to keep fluids down for more than 24 hours due to vomiting.   If it's after clinic hours, physician patients should call the [facility] at [phone] ([phone]); midwife patients should call their answering service at [phone].    How can you care for yourself at home?   You can refer to the Starting Out Right book or find it online at http://[email or web address]/stories/[email or web address]-[email or web address]-Right.pdf or http://www.[email or web address]/images/stories/flipbooks/[facility]-starting-out-right/[facility]-starting-out-right.html#p=8     You can sign up for a weekly parenting e-mail that gives support, tips and advice from health care professionals that " starts with pregnancy and continues through the toddler years. To register, go to [email or web address] at any time during your pregnancy.

## 2021-06-06 NOTE — TELEPHONE ENCOUNTER
Patient Returning Call  Reason for call:  Sandro العراقي calling back  Information relayed to patient:  Below message relayed to patient  Patient has additional questions:  Yes  If YES, what are your questions/concerns:  Patient kaylan's to see MD for  1st OB on 3/6/20 @  1:40, please schedule her .    Okay to leave a detailed message?: Yes           Looks like Kenia Cook didn't have a first OB visit scheduled yet. Is she seeing anyone else? Otherwise I would love to get her in with me as soon as possible, which unfortunately is tough since I'm on call next week and pretty overbooked this week     Otherwise ok to use 1:40 and 2:40 slots (save both?) on 3/6 for the initial OB appt (this is a 40min visit and we can see about moving the 2pm pt earlier/later to provide enough time)     Thanks,   Kenia Valdovinos MD

## 2021-06-07 NOTE — TELEPHONE ENCOUNTER
Left message for Kenia to call back to schedule her 28 week OB appointment. Patient will be doing 1 hour glucose at that time. Please help schedule the week of 5/4-5/8. Please transfer to site if needed.   Evelin Medina LPN

## 2021-06-07 NOTE — TELEPHONE ENCOUNTER
Reached out to patient and was informed she does have the number to central scheduling, and she will call today to schedule her ultrasound. Gayle Choi

## 2021-06-07 NOTE — TELEPHONE ENCOUNTER
Who is calling:  Kenia  Reason for Call:  Patient returning call to clinic, confirmed NO cough, fever, rash or shortness of breath in the last 2 weeks.  Date of last appointment with primary care: 3/9/2020  Okay to leave a detailed message: Yes

## 2021-06-07 NOTE — TELEPHONE ENCOUNTER
Patient stated that she misplaced her bottle and does not know where it is. She stated her last dose of levothyroxine was on Sunday 4/19 and would like this filled ASAP. Last filled 3/30/20. Patient scheduled for in clinic OB visit 5/12 for her 1 hour glucose.   Evelin Medina LPN

## 2021-06-07 NOTE — TELEPHONE ENCOUNTER
Left message to call back for: Kenia  Information to relay to patient:   ----- Message from Kenia Valdovinos MD sent at 3/21/2020  4:32 PM CDT -----  Please follow up to see that pt has scheduled her follow up ultrasound. She was given results via [employer] and phone regarding her 3/9/2020 ultrasound- see that report to help relay message again if she has questions about this. Needs repeat imaging due to fetal spine not well seen.     Also give her [employer] message:    I just wanted to reach out because Dr. Valdovinos recommends starting an over the counter iron supplement (Ferrous Sulfate 325mg) once a day. This is a recent recommendation in light of the coronavirus outbreak. We'll continue to give you updates regarding changes if any to your upcoming appointments. Feel free to reach out with any questions or concerns.

## 2021-06-07 NOTE — PROGRESS NOTES
"DIANE  23w0d  Estimated Date of Delivery: 8/6/20 via 10wk u/s (unknown LMP)    Chief Complaint   Patient presents with     Routine Prenatal Visit     23 weeks       S: Feeling \"great\", good fetal movement. She just picked up her Synthroid dose last week    O: Vitals reviewed, see prenatal flowsheet for measurements.   appears well, no acute distress.  Normal respiratory effort.  Abdomen is soft. No LE swelling.     A/P:     Encounter for supervision of other normal pregnancy in second trimester  Late prenatal care- presented at 18w4d. Reviewed prenatal labs. UDS negative.  - Genetic testing declined.  - Anatomy ultrasound showed BL choroid plexus cysts and fetal spine not optimally viewed.   - Repeat imaging 4/2/2020 indicates there was choroid plexus cyst resolved and the fetal spine was normal on that imaging.    - B+, does not require rhogam this pregnancy  - Baby BOY! Declines circumcision.   - Breastfeeding. 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    Hx of Graves' disease  Postprocedural hypothyroidism  She has a history of Graves' disease, and she received 13 mCi of I-131 ablation on 11/20/2013.  Prepregnancy taking 112mcg Synthroid daily. TSH around 9-10 wk visit was normal at 1.29 with normal T4 and mildly elevated TPO antibodies at 11.5.  - Recheck TSH next visit  - Synthroid increased 3/17/2020 from 112 to 125mcg daily but she only just started this about 7 days ago.    Follow up: 4 weeks for DIANE- 28 week 1hr GTT, CBC, Syphilis, TSH, tdap    Kenia Valdovinos MD      "

## 2021-06-07 NOTE — TELEPHONE ENCOUNTER
Reached out to patient and relayed the below message. No additional questions at this time. Gayle Choi

## 2021-06-07 NOTE — TELEPHONE ENCOUNTER
Medication Request  Medication name:   1. levothyroxine (SYNTHROID, LEVOTHROID) 125 MCG tablet     Requested Pharmacy: [id number]     Reason for request: Patient states she misplaced current Rx . Please send new Rx w/ refills     When did you use medication last?: 04/19/2020    Patient offered appointment:  patient declined , Please call Pt and advise per approval     Okay to leave a detailed message: yes

## 2021-06-07 NOTE — TELEPHONE ENCOUNTER
New Appointment Needed  What is the reason for the visit:    Routine OB for 28 weeks  Provider Preference: Dr. Kenia Lara   How soon do you need to be seen?: week of May 18th   Waitlist offered?: No  Okay to leave a detailed message:  Yes

## 2021-06-07 NOTE — TELEPHONE ENCOUNTER
Pt had not gotten message. I gave her message from Dr. Valdovinos and she states understanding. No questions at this time. She will  new script tomorrow and aware to have level rechecked at next OV.

## 2021-06-08 NOTE — PROGRESS NOTES
DIANE  27w5d  Estimated Date of Delivery: 8/6/20 via 10wk u/s (unknown LMP)    Chief Complaint   Patient presents with     Routine Prenatal Visit     27 weeks 5 days       S: Feeling well, good fetal movement. Currently on Synthroid 125mcg daily, since early April. This was a dose increase from previous dosing of 112mcg.    Both her other children she did not pass the 1hr GTT and needed the 3hr GTT test and passed this.   She has not eaten anything today.     TWG has been ~2lb so far but reports she is eating well. She reported pregravid weight of 153, with weight loss in the first trimester of about 5lbs. Now she is at nearly 2lb weight gain above pregravid weight    No abdominal cramping, vaginal bleeding or LOF.  No RQ abdominal pain, HA, or vision change or LE swelling.    O: Vitals reviewed, see prenatal flowsheet for measurements.   appears well, no acute distress.  Normal respiratory effort.  Abdomen is soft. No LE swelling.     A/P:     Encounter for supervision of other normal pregnancy in second trimester  Late prenatal care- presented at 18w4d. Reviewed prenatal labs. UDS negative.She has not had much weight gain this pregnancy yet but fundal height measuring within 1cm of gestational age.   - Genetic testing declined.  - Anatomy ultrasound showed BL choroid plexus cysts and fetal spine not optimally viewed.   - Repeat imaging 4/2/2020 indicates there was choroid plexus cyst resolved and the fetal spine was normal on that imaging.    - B+, does not require rhogam this pregnancy  - Baby BOY! Declines circumcision.   - Breastfeeding. 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    Hx of Graves' disease  Postprocedural hypothyroidism  She has a history of Graves' disease, and she received 13 mCi of I-131  ablation on 11/20/2013.  Prepregnancy taking 112mcg Synthroid daily. TSH around 9-10 wk visit was normal at 1.29 with normal T4 and mildly elevated TPO antibodies at 11.5.  - Recheck TSH today was back in normal range at 0.99.  - Synthroid increased 3/17/2020 from 112 to 125mcg daily but she only just started this around 4/2/20. We will continue this current dose  - Plan recheck TSH in about 4 weeks      Follow up: 2 weeks VV for 30wk DIANE with Dr. Altman- follow up weight gain, then 4 weeks 32wk DIANE with TSH with me (Virtual in clinic week of June 8)    Kenia Valdovinos MD

## 2021-06-08 NOTE — PROGRESS NOTES
"Kenia Cook is a 25 y.o. female who is being evaluated via a billable video visit.      The patient has been notified of following:     \"This video visit will be conducted via a call between you and your physician/provider. We have found that certain health care needs can be provided without the need for an in-person physical exam.  This service lets us provide the care you need with a video conversation.  If a prescription is necessary we can send it directly to your pharmacy.  If lab work is needed we can place an order for that and you can then stop by our lab to have the test done at a later time.    Video visits are billed at different rates depending on your insurance coverage. Please reach out to your insurance provider with any questions.    If during the course of the call the physician/provider feels a video visit is not appropriate, you will not be charged for this service.\"    Patient has given verbal consent to a Video visit? Yes    Patient would like to receive their AVS by AVS Preference: Adia.    Patient would like the video invitation sent by: Send to e-mail at: [email or web address]    Will anyone else be joining your video visit? No        Video Start Time: 2:30pm    Additional provider notes:   Virtual Visit d/t COVID19 - Return OB Visit    Kenia Cook is a 25 y.o.  female who presents to clinic for a follow up OB visit. She is currently 30w0d with an estimated date of delivery of 2020 via 10wk US. She denies headache, chest pain, shortness of breath, abdominal pain/contractions, vaginal bleeding, or abnormal discharge. Active fetal movement.     New concerns today: none  -taking synthroid daily as prescribed, no issues  -hasn't checked weight at home - reports eating regular meals    OB History    Para Term  AB Living   3 2 2 0 0 2   SAB TAB Ectopic Multiple Live Births   0 0 0 0 2      # Outcome Date GA Lbr John/2nd Weight Sex Delivery Anes PTL Lv   3 " Current            2 Term 13 39w1d    Vag-Spont EPI N EVELINE      Complications: Fever   1 Term 11     Vag-Spont EPI  EVELINE       Physical exam:  LMP 2019 (LMP Unknown)     General: alert, female in no acute distress  Respiratory: no cough or SOB    Assessment/Plan:  1. Encounter for supervision of other normal pregnancy in third trimester   at 30 weeks today. Pregnancy complicated by hypothyroidism and poor weight gain. Discussed depending on weight gain at next visit a growth US may be recommended.  - Genetic testing declined.  - Anatomy ultrasound showed BL choroid plexus cysts and fetal spine not optimally viewed.   - Repeat imaging 2020 indicates there was choroid plexus cyst resolved and the fetal spine was normal on that imaging.    - B+, does not require rhogam this pregnancy  - Baby BOY! Declines circumcision.   - Breastfeeding. Breast pump Rx given   - Birth plan: WW, epidural  -28 week 1hr GTT (passed)  - Tdap given    2. Postprocedural hypothyroidism with hx of Graves s/p Iodine ablation  She has a history of Graves' disease, and she received 13 mCi of I-131 ablation on 2013.  Prepregnancy taking 112mcg Synthroid daily. TSH around 9-10 wk visit was normal at 1.29 with normal T4 and mildly elevated TPO antibodies at 11.5.  - Synthroid increased on 3/17/2020 from 112 to 125mcg daily after TSH level returned at 17.60 - repeat TSH 0.99 on 20  - Plan recheck TSH in 2 weeks, continue current synthroid dose    3. LSIL on pap with HPV negative 3/2020 currently pregnant, needs colposcopy 6 wks pp  Recent pap 3/9/2020 showed LSIL with negative HPV cotesting; needs colposcopy postpartum with repeat pap at that time.      Follow up in 2 weeks for routine prenatal visit with TSH check.     Jaylin Altman MD      Video-Visit Details    Type of service:  Video Visit    Video End Time (time video stopped): 2:38 pm  Originating Location (pt. Location): Home    Distant Location  (provider location):  [facility]/OB     Platform used for Video Visit: Miri Altman MD

## 2021-06-08 NOTE — TELEPHONE ENCOUNTER
Left message to call back for: Kenia   Information to relay to patient: Per Dr. Abdi she would like to see her for a video visit to follow up on her Synthroid. Please assist in scheduling this.       KELECHI Day

## 2021-06-08 NOTE — TELEPHONE ENCOUNTER
Pharmacy request on Synthoid for a 90 day supply . Please advise request sent for 30 days on 5/20/20 by Aruna. Racquel Charles LPN

## 2021-06-08 NOTE — TELEPHONE ENCOUNTER
Received med refill request from pharmacy. Last filled 4/28/20. Last TSH 5/12/20.   Evelin Medina LPN

## 2021-06-08 NOTE — PATIENT INSTRUCTIONS - HE
Phone Numbers:  [facility]: [phone]  [facility]: [phone]     When to call or come in to the hospital     If you notice a decrease in your baby's movement.     If your begin to experience contractions that are 5 minutes or less apart and lasting for over 45 seconds, or if contractions are becoming more painful.    If you have any bleeding or leakage of fluids.     If you have a headache not resolved with tylenol, right upper abdominal pain, or sudden onset of swelling.    You know your body best. Never hesitate to call or go to the hospital if something doesn't feel right!    After-baby Birth Control Methods   It is important to consider contraception after your baby is born if you would like to prevent a pregnancy in the near future. If you are breast feeding, talk with your doctor to determine the best method for you. It is recommended that you wait 12 months after the birth of your baby to get pregnant again.   Natural Family Planning  It is possible to avoid pregnancy or time them based on your family goals without any hormones or medications or need for condoms. In order to be successful with this method, both partners need to be diligent in tracking fertility/ovulation and abiding by abstinence during certain times of the month to avoid pregnancy.  Condoms   Latex condoms can prevent pregnancy and STDs. Condoms work best when used with spermicide that is placed inside the vagina as well as inside the condom. Use only water-based lubricants. Petroleum based products (such as Vaseline and many massage oils) can weaken the latex and cause it to break.   IUD   IUD's are made of flexible plastic and must be inserted into your uterus by a doctor. The IUD works by stopping the fertilized egg from attaching itself to the uterus. IUDs may increase the risk of getting a pelvic infection (PID), which can lead to infertility if not diagnosed and treated early. This is a great option after delivery of your  baby! These last for 3, 5, or 10 years depending up on which type you choose, but can be removed earlier if you decide you would like to get pregnant sooner.  Tubal Ligation & Vasectomy   These are good choices for women and men who know that they do not want to have any more children.     HORMONES   Birth control pills, hormone implants and shots work by stopping ovulation (release of the egg from the ovary). Implants are placed in the upper arm by a minor surgical incision. They last for three years, but can be removed by your doctor if you decide to get pregnant. Hormone injections must be repeated every three months. The Pill must be taken every day.   All hormones can have side effects and create certain health risks. They are very effective in preventing pregnancy but they do not prevent STDs. You can talk more about the risks and benefits with your doctor.     Controlling Back Pain  As your body changes during pregnancy, your back must work in new ways. Back pain is due to many causes. Physical changes in your body can strain your back and its supporting muscles. Also, hormones (chemicals that carry messages throughout the body) increase during pregnancy. This can affect how the muscles and joints work together. All of these changes can lead to pain in the upper or lower back or pelvis. Some pregnant women have sciatica, pain caused by pressure on the sciatic nerve running down the back of the leg. Ask your healthcare provider for specific tips and exercises to help control your back pain.    Tips to Help You Rest  Good rest and sleep will help you feel better. Here are some ideas:    Ask your partner to massage your shoulders, neck, or back.    Limit the errands you do each day.    Lie down in the afternoon or after work for a few minutes.    Take a warm bath before you go to sleep.    Drink warm milk or teas without caffeine.    Avoid coffee, black tea, and cola.    Preventing Heartburn    Avoid spicy or  acidic foods.     Eat small amounts more often.    Wait 2 hours after eating before lying down     Sleep with your upper body raised 6 inches.    May use Tums as needed. Talk to your doctor about other medications to try.     [employer] parenting classes [email or web address]  [place] parenting classes [email or web address]

## 2021-06-08 NOTE — PROGRESS NOTES
DIANE  32w0d    Estimated Date of Delivery: 8/6/20 via 10wk u/s (unknown LMP)    Chief Complaint   Patient presents with     Routine Prenatal Visit     32 weeks       S: Feeling well, good fetal movement. Currently on Synthroid 125mcg daily, since early April. This was a dose increase from previous dosing of 112mcg.    TWG has been ~4lb so far but reports she is eating well. Following a nice trend in weight upward, not excessive. She reported pregravid weight of 153, with weight loss in the first trimester of about 5lbs.     No abdominal cramping, vaginal bleeding or LOF.  No RQ abdominal pain, HA, or vision change or LE swelling.    O: Vitals reviewed, see prenatal flowsheet for measurements.   appears well, no acute distress.  Normal respiratory effort.  Abdomen is soft. No LE swelling.     A/P:     Encounter for supervision of other normal pregnancy in third trimester  Late prenatal care- presented at 18w4d.   Labs reviewed. UDS negative.     Poor weight gain: She has not had much weight gain this pregnancy- FH <<GA (by 2cm), more than last visit. Only 3.75lb weight gain thus far in pregnancy. Following growth curve for weight gain after 1st tri weight loss however. Her previous babies were 6lbs.     At this point I've advised a growth ultrasound given hx of uncontrolled hypothyroidism.     - Genetic testing declined.  - Anatomy ultrasound showed BL choroid plexus cysts and fetal spine not optimally viewed.   - Repeat imaging 4/2/2020 indicates there was choroid plexus cyst resolved and the fetal spine was normal on that imaging.    - B+, does not require rhogam this pregnancy  - passed 1hr GTT  - tdap given  - Baby BOY! Declines circumcision.   - Breastfeeding. Breast pump Rx given   - Birth plan: WW, epidural  -  Placed order for Growth ultrasound      Hx of Graves' disease  Postprocedural hypothyroidism  She has a history of Graves' disease, and she received 13 mCi of I-131 ablation on 11/20/2013.  Prepregnancy  taking 112mcg Synthroid daily. TSH around 9-10 wk visit was normal at 1.29 with normal T4 and mildly elevated TPO antibodies at 11.5.  - Recheck TSH was back in normal range at 0.99 in May reflecting the increased Synthroid dose of 125mcg since April.   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    Follow up: 2 weeks with Dr. Beauchamp for 34wk check- consider NST if TSH comes back abnormal; in 3 weeks with myself for 35wk visit.    Kenia Valdovinos MD

## 2021-06-09 NOTE — PROGRESS NOTES
DIANE  35w0d  Estimated Date of Delivery: 8/6/20 via 10wk u/s (unknown LMP)    Chief Complaint   Patient presents with     Routine Prenatal Visit     35 weeks       S: Feeling well, good fetal movement. Currently on Synthroid 125mcg daily, since early April. This was a dose increase from previous dosing of 112mcg.    Daughter's birth: natural labor at 40 weeks  Son's birth: natural labor, 37 weeks  No complications or tears with these deliveries.    Recent fetal growth showed EFW 45%ile.   TWG has been ~6lb so far but reports she is eating well. Following a nice trend in weight upward, not excessive. She reported pregravid weight of 153, with weight loss in the first trimester of about 5lbs.     No abdominal cramping, vaginal bleeding or LOF.  No RQ abdominal pain, HA, or vision change or LE swelling.    O: Vitals reviewed, see prenatal flowsheet for measurements.   appears well, no acute distress.  Normal respiratory effort.  Abdomen is soft. No LE swelling.     A/P:     Encounter for supervision of other normal pregnancy in third trimester  Late prenatal care- presented at 18w4d.   Labs reviewed. UDS negative.   - Genetic testing declined.  - Anatomy ultrasound showed BL choroid plexus cysts and fetal spine not optimally viewed.   - Repeat imaging 4/2/2020 indicates there was choroid plexus cyst resolved and the fetal spine was normal on that imaging.    - B+, does not require rhogam this pregnancy  - passed 1hr GTT  - tdap given  - Baby BOY! Declines circumcision.   - Breastfeeding. Breast pump Rx given   - Birth plan: WW, epidural    Poor weight gain: She has not had much weight gain this pregnancy- FH <<GA (by 2cm), more than last visit. Only 3.75lb weight gain thus far in pregnancy. Following growth curve for weight gain after 1st tri weight loss however. Her previous babies were 6lbs.   - Growth ultrasound on 6/17 reassuring with normal interval growth and EFW 44%, 1930g    Hx of Graves' disease  Postprocedural  hypothyroidism  She has a history of Graves' disease, and she received 13 mCi of I-131 ablation on 11/20/2013.  Prepregnancy taking 112mcg Synthroid daily. TSH around 9-10 wk visit was normal at 1.29 with normal T4 and mildly elevated TPO antibodies at 11.5.  - TSH has been stable now since April. 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    Follow up: weekly until delivery- TSH at next visit    Kenia Valdovinos MD

## 2021-06-09 NOTE — PROGRESS NOTES
ASSESSMENT/PLAN:  1. Hypothyroidism Postprocedural, graves' disease  - Thyroid Stimulating Hormone (TSH); Future  - T4, Free; Future  - levothyroxine (SYNTHROID, LEVOTHROID) 100 MCG tablet; TAKE 1 TABLET BY MOUTH DAILY  Dispense: 60 tablet; Refill: 0  Last dose was 1 week ago.  Has gained significant amount of weight and wondering if dose is accurate.  Come back in 2 months for lab only appointment and will adjust dose if indicated.    Orders Placed This Encounter   Procedures     Thyroid Stimulating Hormone (TSH)     Standing Status:   Future     Standing Expiration Date:   2/20/2018     T4, Free     Standing Status:   Future     Standing Expiration Date:   2/20/2018     Medications Discontinued During This Encounter   Medication Reason     cetirizine (ZYRTEC) 10 MG tablet Therapy completed     tobramycin-dexamethasone (TOBRADEX) ophthalmic ointment Therapy completed     levothyroxine (SYNTHROID, LEVOTHROID) 100 MCG tablet Reorder       No Follow-up on file.    CHIEF COMPLAINT:  Chief Complaint   Patient presents with     med check     Synthroid. Need refill on Synthroid       HISTORY OF PRESENT ILLNESS:  Kenia is a 21 y.o. female presenting to the clinic today to follow up regarding her hypothyroidism.    Hypothyroidism: She has not been taking her Synthroid medication over the last week, as she has needed refills. She notes decrease in energy lately. Of note, her sister has thyroid disease.      Weight gain: She has been gaining weight recently. Her weight is up to 165 pounds from 133 pounds in November 2015. She has been trying to exercise regularly.     REVIEW OF SYSTEMS:   Constitutional: Negative.   HENT: Negative.   Eyes: Negative.   Respiratory: Negative.   Cardiovascular: Negative.   Gastrointestinal: Negative.   Endocrine: Positive for weight gain, as noted above.   Genitourinary: Negative.   Musculoskeletal: Negative.   Skin: Negative.   Allergic/Immunologic: Negative.   Neurological: Negative.    Hematological: Negative.   Psychiatric/Behavioral: Negative.   All other systems are negative.    PFSH:  Reviewed, as below    TOBACCO USE:  History   Smoking Status     Never Smoker   Smokeless Tobacco     Not on file       VITALS:  Vitals:    02/20/17 1345   BP: 104/86   Patient Site: Left Arm   Patient Position: Sitting   Cuff Size: Adult Regular   Pulse: 80   Weight: 165 lb (74.8 kg)     Wt Readings from Last 3 Encounters:   02/20/17 165 lb (74.8 kg)   11/12/15 133 lb 9.6 oz (60.6 kg)   03/05/15 134 lb (60.8 kg)       PHYSICAL EXAM:  Constitutional: Patient is oriented to person, place, and time. Patient appears well-developed and well-nourished. No distress.   Neurological: Patient is alert and oriented to person, place, and time. Patient has normal reflexes. No cranial nerve deficit. Coordination normal.       ADDITIONAL HISTORY SUMMARIZED (2): Reviewed previous office note from 3/5/15 regarding Graves' disease.   DECISION TO OBTAIN EXTRA INFORMATION (1): None.   RADIOLOGY TESTS (1): None.  LABS (1): Ordered labs today.   MEDICINE TESTS (1): None.  INDEPENDENT REVIEW (2 each): None.     The visit lasted a total of 6 minutes face to face with the patient. Over 50% of the time was spent counseling and educating the patient about medications.    IChen, am scribing for and in the presence of, Dr. Abdi.    I, Dr. Abdi, personally performed the services described in this documentation, as scribed by Chen Shelley in my presence, and it is both accurate and complete.    MEDICATIONS:  Current Outpatient Prescriptions   Medication Sig Dispense Refill     levothyroxine (SYNTHROID, LEVOTHROID) 100 MCG tablet TAKE 1 TABLET BY MOUTH DAILY 60 tablet 0     No current facility-administered medications for this visit.        Total data points: 3

## 2021-06-09 NOTE — PROGRESS NOTES
Doing overall very good  fetal movement.   Currently on Synthroid 125mcg daily, since early April. This was a dose increase from previous dosing of 112mcg. TSH has been stable, last check 6/11.   No new s/s of labor   GBS + aware of that and treatment plan during labor   No LOF or bleeding just more mucus discharge   Follow up next wk with Dr Niki Beauchamp MD 7/23/2020 2:29 PM

## 2021-06-09 NOTE — PROGRESS NOTES
Feeling well, good fetal movement. Currently on Synthroid 125mcg daily, since early April.   This was a dose increase from previous dosing of 112mcg.  Last TSH level within normal limits so NST or BPP not ordered   Total wt gain 10 ib trying eat more frequently   No new s/s . labor , education on when to call provided   Plan GBS next 2 wk     Jessica Beauchamp MD 2020 3:09 PM

## 2021-06-09 NOTE — PROGRESS NOTES
DIANE  37w0d  Estimated Date of Delivery: 8/6/20 via 10wk u/s (unknown LMP)    Chief Complaint   Patient presents with     Routine Prenatal Visit     37 weeks       S: Feeling well, good fetal movement. Currently on Synthroid 125mcg daily, since early April. This was a dose increase from previous dosing of 112mcg. TSH has been stable, last check 6/11.     Daughter's birth: natural labor at 40 weeks  Son's birth: natural labor, 37 weeks  No complications or tears with these deliveries.    Recent fetal growth showed EFW 45%ile from 6/17.   TWG has been ~5-6lb so far but reports she is eating well. Following a slight upward trend in weight, not excessive. She reported pregravid weight of 153, with weight loss in the first trimester of about 5lbs.     No abdominal cramping, vaginal bleeding or LOF.  No RQ abdominal pain, HA, or vision change or LE swelling.    O: Vitals reviewed, see prenatal flowsheet for measurements.  Vertex by bedside ultrasound today.   appears well, no acute distress.  Normal respiratory effort.  Abdomen is soft. No LE swelling.     A/P:     Encounter for supervision of other normal pregnancy in third trimester  Late prenatal care- presented at 18w4d.   Labs reviewed. UDS negative.   Vertex by bedside ultrasound again @ 37wks.  - Genetic testing declined.  - Anatomy ultrasound showed BL choroid plexus cysts and fetal spine not optimally viewed.   - Repeat imaging 4/2/2020 indicates there was choroid plexus cyst resolved and the fetal spine was normal on that imaging.    - Growth u/s on 6/17 with EFW 45%ile--> repeat u/s ordered  - B+, does not require rhogam this pregnancy  - passed 1hr GTT  - tdap given  - Baby BOY! Declines circumcision.   - Breastfeeding. Breast pump Rx given   - Birth plan: WW, epidural  - GBS +on 7/2    GBS positive  Will need abx in labor    Poor weight gain: She has not had much weight gain this pregnancy despite healthy appetite.  FH <<GA (by 2cm), more than last visit. Only  3.75lb weight gain thus far in pregnancy. Following growth curve for weight gain after 1st tri weight loss however. Her previous babies were 6lbs.   - Growth ultrasound on 6/17 reassuring with normal interval growth and EFW 44%, 1930g  - repeat growth u/s this week    Hx of Graves' disease  Postprocedural hypothyroidism  She has a history of Graves' disease, and she received 13 mCi of I-131 ablation on 11/20/2013.  Prepregnancy taking 112mcg Synthroid daily. TSH around 9-10 wk visit was normal at 1.29 with normal T4 and mildly elevated TPO antibodies at 11.5.  - TSH has been stable now since April. 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    Follow up: weekly until delivery    Kenia Valdovinos MD

## 2021-06-09 NOTE — TELEPHONE ENCOUNTER
Refill Approved    Rx renewed per Medication Renewal Policy. Medication was last renewed on 5/28/20.    Vivian Underwood, [facility] Triage/Med Refill 6/22/2020     Requested Prescriptions   Pending Prescriptions Disp Refills     levothyroxine (SYNTHROID, LEVOTHROID) 125 MCG tablet [Pharmacy Med Name: LEVOTHYROXINE 125 MCG TABLET] 30 tablet 0     Sig: TAKE 1 TABLET (125 MCG TOTAL) BY MOUTH DAILY. RECHECK TSH AT YOUR NEXT OB APPOINTMENT.       Thyroid Hormones Protocol Passed - 6/21/2020 12:31 PM        Passed - Provider visit in past 12 months or next 3 months     Last office visit with prescriber/PCP: 1/3/2020 Kenia Valdovinos MD OR same dept: 1/3/2020 Kenia Valdovinos MD OR same specialty: 1/3/2020 Kenia Valdovinos MD  Last physical: Visit date not found Last MTM visit: Visit date not found   Next visit within 3 mo: Visit date not found  Next physical within 3 mo: Visit date not found  Prescriber OR PCP: Kenia Valdovinos MD  Last diagnosis associated with med order: 1. Acquired hypothyroidism  - levothyroxine (SYNTHROID, LEVOTHROID) 125 MCG tablet [Pharmacy Med Name: LEVOTHYROXINE 125 MCG TABLET]; TAKE 1 TABLET (125 MCG TOTAL) BY MOUTH DAILY. RECHECK TSH AT YOUR NEXT OB APPOINTMENT.  Dispense: 30 tablet; Refill: 0    If protocol passes may refill for 12 months if within 3 months of last provider visit (or a total of 15 months).             Passed - TSH on file in past 12 months for patient age 12 & older     TSH   Date Value Ref Range Status   06/11/2020 1.82 0.30 - 5.00 uIU/mL Final

## 2021-06-10 ENCOUNTER — OFFICE VISIT - HEALTHEAST (OUTPATIENT)
Dept: FAMILY MEDICINE | Facility: CLINIC | Age: 26
End: 2021-06-10

## 2021-06-10 DIAGNOSIS — R21 RASH AND NONSPECIFIC SKIN ERUPTION: ICD-10-CM

## 2021-06-10 NOTE — TELEPHONE ENCOUNTER
NEFTALI 8/6/20  Dr. Prince  Planning to deliver at [facility]  3rd baby, last baby labor was <6 hours    Thinks she is going into labor    Contractions started at 3am   Contractions every 3-5 min for 2 hours  Lasting 30-45 sec  Not getting more intense yet   Rates 4-5/10    Bloody mucus    Have not felt the baby move since starting contractions    No abdominal pain  No leakage of fluid  No new hand/face swelling   No fever    Triaged to a disposition of Go to E&D Now. Patient is agreeable.     COVID 19 Nurse Triage Plan/Patient Instructions    Please be aware that novel coronavirus (COVID-19) may be circulating in the community. If you develop symptoms such as fever, cough, or SOB or if you have concerns about the presence of another infection including coronavirus (COVID-19), please contact your health care provider or visit [email or web address].     Disposition/Instructions    ED Visit recommended. Follow protocol based instructions.      Bring Your Own Device:  Please also bring your smart device(s) (smart phones, tablets, laptops) and their charging cables for your personal use and to communicate with your care team during your visit.      Thank you for taking steps to prevent the spread of this virus.  o Limit your contact with others.  o Wear a simple mask to cover your cough.  o Wash your hands well and often.    Resources    [facility]: About COVID-19: [email or web address]    CDC: What to Do If You're Sick: www.cdc.gov/coronavirus/2019-ncov/about/steps-when-sick.html    CDC: Ending Home Isolation: www.cdc.gov/coronavirus/2019-ncov/hcp/disposition-in-home-patients.html     CDC: Caring for Someone: www.cdc.gov/coronavirus/2019-ncov/if-you-are-sick/care-for-someone.html     [facility]: Interim Guidance for Hospital Discharge to Home: www.[email or web address]/diseases/coronavirus/hcp/hospdischarge.pdf    [facility] clinical trials (COVID-19 research studies):  "[email or web address]-clinical-trials     Below are the Arc[employer]-19 hotlines at the [place] Department of Health ([facility]). Interpreters are available.   o For health questions: Call 627-868-9147 or 1-944.259.6863 (7 a.m. to 7 p.m.)  o For questions about schools and childcare: Call 169-352-2571 or 1-368.227.9445 (7 a.m. to 7 p.m.)     Reason for Disposition    [1] History of prior delivery (multipara) AND [2] contractions < 10 minutes apart AND [3] present 1 hour    Baby moving less today (e.g., kick count < 5 in 1 hour or < 10 in 2 hours)    Additional Information    Negative: Passed out (i.e., lost consciousness, collapsed and was not responding)    Negative: Shock suspected (e.g., cold/pale/clammy skin, too weak to stand, low BP, rapid pulse)    Negative: Difficult to awaken or acting confused (e.g., disoriented, slurred speech)    Negative: [1] SEVERE abdominal pain (e.g., excruciating) AND [2] constant AND [3] present > 1 hour    Negative: Severe bleeding (e.g., continuous red blood from vagina, or large blood clots)    Negative: Umbilical cord hanging out of the vagina (shiny, white, curled appearance, \"like telephone cord\")    Negative: Uncontrollable urge to push (i.e., feels like baby is coming out now)    Negative: Can see baby    Negative: Sounds like a life-threatening emergency to the triager    Negative: Pregnant < 37 weeks (i.e., )    Negative: [1] Uncertain delivery date AND [2] possibly pregnant < 37 weeks (i.e., )    Negative: [1] First baby (primipara) AND [2] contractions < 6 minutes apart  AND [3] present 2 hours    Protocols used: PREGNANCY - LABOR-A-    Consuelo Swain RN Triage Nurse Advisor      "

## 2021-06-10 NOTE — ANESTHESIA PREPROCEDURE EVALUATION
Anesthesia Evaluation      Patient summary reviewed   No history of anesthetic complications     Airway    Pulmonary - negative ROS                          Cardiovascular - negative ROS  Exercise tolerance: > or = 4 METS   Neuro/Psych - negative ROS     Endo/Other    (+) hypothyroidism, obesity, pregnant     GI/Hepatic/Renal    (+) GERD well controlled,             Dental                         Anesthesia Plan  Planned anesthetic: epidural    ASA 2   Induction: intravenous   Anesthetic plan and risks discussed with: patient    Post-op plan: epidural analgesia and routine recovery

## 2021-06-10 NOTE — PROGRESS NOTES
DIANE  39w0d  Estimated Date of Delivery: 8/6/20 via 10wk u/s (unknown LMP)    Chief Complaint   Patient presents with     Routine Prenatal Visit     39 weeks       S: Doing well.  Not feeling any contractions yet.  Prefers to wait till 41 weeks for an induction if necessary.  TWG of 7lbs. Recent growth ultrasound on the 21st shows normal interval fetal growth with EFW 45 percentile.  We did this given her history of hypothyroidism on Synthroid which is been stable since April for dosing.    Daughter's birth: natural labor at 40 weeks  Son's birth: natural labor, 37 weeks  No complications or tears with these deliveries.    No abdominal cramping, vaginal bleeding or LOF.  No RQ abdominal pain, HA, or vision change or LE swelling.    O: Vitals reviewed, see prenatal flowsheet for measurements.   appears well, no acute distress.  Normal respiratory effort.  Abdomen is soft. No LE swelling.   Cervix is a hard reach 1/50%/-3 posterior and firm consistency    A/P:     Encounter for supervision of other normal pregnancy in third trimester  Late prenatal care- presented at 18w4d.   Labs reviewed. UDS negative.   Vertex by u/s on 721 and c/w on exam today.  - Genetic testing declined.  - Anatomy ultrasound showed BL choroid plexus cysts and fetal spine not optimally viewed.   - Repeat imaging 4/2/2020 indicates there was choroid plexus cyst resolved and the fetal spine was normal on that imaging.    - Growth u/s on 6/17 with EFW 45%ile--> repeat u/s  7/21 with EFW 45%ile  - B+  - passed 1hr GTT  - tdap given  - Baby BOY! Declines circumcision.  EFW 45%ile 2927 g as of 7/21  - Breastfeeding. Breast pump Rx given   - Birth plan: WW, epidural  - GBS +on 7/2, needs Abx in labor    GBS positive  Will need abx in labor    Poor weight gain: She has not had much weight gain this pregnancy despite healthy appetite.  FH <<GA (by 2cm), more than last visit. Only 3.75lb weight gain thus far in pregnancy. Following growth curve for weight  gain after 1st tri weight loss however. Her previous babies were 6lbs.   - Growth ultrasound on 6/17 reassuring with normal interval growth and EFW 44%, 1930g  - repeat growth u/s 7/21 again reassuring with EFW 45%ile 2927 g     Hx of Graves' disease  Postprocedural hypothyroidism  She has a history of Graves' disease, and she received 13 mCi of I-131 ablation on 11/20/2013.  Prepregnancy taking 112mcg Synthroid daily. TSH around 9-10 wk visit was normal at 1.29 with normal T4 and mildly elevated TPO antibodies at 11.5.  - TSH has been stable now since April. Last 0.77 7/16/2020  - Continue Synthroid dose 125mcg (since April)       LSIL, HPV negative  Recent pap 3/9/2020 showed LSIL with negative HPV cotesting; needs colposcopy post partum with repeat pap at that time.    Follow up: weekly until delivery    Kenia Valdovinos MD

## 2021-06-10 NOTE — ANESTHESIA POSTPROCEDURE EVALUATION
Patient: Kenia Cook  * No procedures listed *  Anesthesia type: epidural    Patient location:   Last vitals: No vitals data found for the desired time range.    Post vital signs: stable  Level of consciousness: awake and responds to simple questions  Post-anesthesia pain: pain controlled  Post-anesthesia nausea and vomiting: no  Pulmonary: unassisted, return to baseline  Cardiovascular: stable and blood pressure at baseline  Hydration: adequate  Anesthetic events: No apparent anesthesia issues.

## 2021-06-10 NOTE — ANESTHESIA PROCEDURE NOTES
Epidural Block    Patient location during procedure: OB  Time Called: 8/3/2020 7:57 AM  Reason for Block:labor epidural  Staffing:  Performing  Anesthesiologist: Boo Ramos MD  Preanesthetic Checklist  Completed: patient identified, risks, benefits, and alternatives discussed, timeout performed, consent obtained, at patient's request, airway assessed, oxygen available, suction available, emergency drugs available and hand hygiene performed  Procedure  Patient position: sitting  Prep: ChloraPrep  Patient monitoring: continuous pulse oximetry, heart rate and blood pressure  Approach: midline  Location: L3-L4  Injection technique: CODI air  Number of Attempts:1  Needle  Needle type: Roberto   Needle gauge: 18 G     Catheter in Space: 4  Assessment  Sensory level:  No complications

## 2021-06-11 NOTE — TELEPHONE ENCOUNTER
Please help reschedule post partum check up as soon as able so we can check in on thyroid labs/pap. (was scheduled on 9/14 but did not come for this).

## 2021-06-11 NOTE — TELEPHONE ENCOUNTER
Left message for patient to call back to schedule a postpartum check to follow up on labs as soon as able.   Evelin Medina LPN

## 2021-06-11 NOTE — TELEPHONE ENCOUNTER
Left message for patient that we need to schedule Postpartum check and to check labs as soon as able. Please help schedule.   Evelin Medina LPN

## 2021-06-12 NOTE — TELEPHONE ENCOUNTER
Left message for Kenia regarding her appt and her sons that were both missed. Please help schedule when they call back.   Evelin Medina LPN

## 2021-06-16 PROBLEM — R87.612 LOW GRADE SQUAMOUS INTRAEPITH LESION ON CYTOLOGIC SMEAR CERVIX (LGSIL): Status: ACTIVE | Noted: 2020-03-17

## 2021-06-16 NOTE — TELEPHONE ENCOUNTER
Telephone Encounter by Gayle Choi CMA at 2/19/2020 11:46 AM     Author: Gayle Choi CMA Service: -- Author Type: Certified Medical Assistant    Filed: 2/19/2020 11:47 AM Encounter Date: 2/19/2020 Status: Signed    : Gayle Choi CMA (Certified Medical Assistant)       Reached out to patient and left a message to return phone call. Please relay the below message when patient calls back. Thank you, Kenia Aragon MD P Svendsen, Jennifer Care Team Pool             Looks like Kenia Cook didn't have a first OB visit scheduled yet. Is she seeing anyone else? Otherwise I would love to get her in with me as soon as possible, which unfortunately is tough since I'm on call next week and pretty overbooked this week.     I have a 2pm appt on Fri 2/28 that I would like to add her to the schedule if at all possible. (The pt Ligia Elia currently scheduled at that date/time unfortunately had a miscarriage so won't be needing that appointment).     Otherwise ok to use 1:40 and 2:40 slots (save both?) on 3/6 for the initial OB appt (this is a 40min visit and we can see about moving the 2pm pt earlier/later to provide enough time)     Thanks,   Kenia Valdovinos MD

## 2021-06-17 NOTE — PATIENT INSTRUCTIONS - HE
Patient Instructions by Racehl Garcia CNP at 7/8/2019 12:20 PM     Author: Rachel Garcia CNP Service: -- Author Type: Nurse Practitioner    Filed: 7/8/2019  2:59 PM Encounter Date: 7/8/2019 Status: Signed    : Rachel Garcia CNP (Nurse Practitioner)       Wear your Aircast.  Use crutches.  Minimal to no weightbearing.    Avoid soaking your ankle wound in bath water.  Please take showers only.    Once daily, wash gently with soap and water, apply thin layer bacitracin and nonadherent dressing.  Watch for signs of infection including expanding redness.    If you can jump on your affected foot 10 times without pain, you can get rid of the crutches.  If you are still having a lot of pain in a week, please see your doctor for a recheck.    Patient Education     Treating Ankle Sprains  Treatment will depend on how bad your sprain is. For a severe sprain, healing may take 3 months or more.  Right after your injury: Use R.I.C.E.    Rest: At first, keep weight off the ankle as much as you can. You may be given crutches to help you walk without putting weight on the ankle.    Ice: Put an ice pack on the ankle for 20 minutes. Remove the pack and wait at least 30 minutes. Repeat for up to 3 days. This helps reduce swelling.    Compression: To reduce swelling and keep the joint stable, you may need to wrap the ankle with an elastic bandage. For more severe sprains, you may need an ankle brace, a boot, or a cast.    Elevation: To reduce swelling, keep your ankle raised above your heart when you sit or lie down.  Medicine  Your healthcare provider may suggest oral nonsteroidal anti-inflammatory medicine (NSAIDs), such as ibuprofen. This relieves the pain and helps reduce swelling. Be sure to take your medicine as directed.  Exercises    After about 2 to 3 weeks, you may be given exercises to strengthen the ligaments and muscles in the ankle. Doing these exercises will help prevent another ankle sprain. Exercises  may include standing on your toes and then on your heels and doing ankle curls.  Ankle curls    Sit on the edge of a sturdy table or lie on your back.    Pull your toes toward you. Then point them away from you. Repeat for 2 to 3 minutes.   Date Last Reviewed: 1/1/2018 2000-2017 [employer]. [address] [place] [address], [place] [id number]. All rights reserved. This information is not intended as a substitute for professional medical care. Always follow your healthcare professional's instructions.

## 2021-06-18 NOTE — PATIENT INSTRUCTIONS - HE
Patient Instructions by Kenia Valdovinos MD at 4/9/2020  2:30 PM     Author: Kenia Valdovinos MD Service: -- Author Type: Physician    Filed: 4/9/2020  3:03 PM Encounter Date: 4/9/2020 Status: Addendum    : Kenia Valdovinos MD (Physician)    Related Notes: Original Note by Kenia Valdovinos MD (Physician) filed at 4/9/2020  3:02 PM       [email or web address] for breast pump  Patient Education   HEALTHY PREGNANCY CARE: 22-26 WEEKS PREGNANT    You are finishing your second trimester. Your baby is developing rapidly. At this stage, babies have a sense of balance, can respond to touch, and are recognizing parent voices.  Your baby will be moving around more now.  You may notice [place] contractions now, which are painless and prepare the uterus for the delivery.    Nutrition: During this time, you may find that your nausea and fatigue are gone. Overall, you may feel better and have more energy than you did in your first trimester. Be sure you are getting enough calcium and iron in your diet. Your prenatal vitamins cannot supply all of the nutrients you need, so continue to eat 3-4 servings of dairy foods and 2-3 servings of meat/fish/poultry/nuts every day. Foods high in iron include: red meats, eggs, dark green vegetables, dark yellow vegetables, nuts, kidney beans and chickpeas. Some cereals are fortified with iron, so look at the food labels for 100% of the daily requirement for iron.     Discuss your work situation with your midwife or physician as needed. If you stand for long periods of time, you may need to make changes and take breaks.    [place] for childbirth and parenting classes, including an infant CPR class. Breastfeeding classes are recommended too.    Plan for the gestational diabetes screening between weeks 24-28. You can eat normally before that visit; we would suggest making sure you have protein foods, but not a lot of carbohydrates or sugary foods.    Your blood type was  "determined at your first OB appointment. If you are Rh negative, you should discuss the need for a Rhogam shot with your midwife or physician. This would be administered around 28 weeks if necessary.    How can you care for yourself at home?   You can refer to the Starting Out Right book or find it online at [email or web address] or [email or web address]     You can sign up for a weekly parenting e-mail that gives support, tips and advice from health care professionals that starts with pregnancy and continues through the toddler years. To register, go to [email or web address] at any time during your pregnancy.    Watch for the warning signs of premature labor:   \" Dull, low backache  \" Contractions of the uterus, menstrual-like cramps  \" Abdominal cramping with or without diarrhea  \" More pelvic pressure  \" Increase or change in vaginal discharge.     Continue to watch for signs and symptoms of preeclampsia:   \" Sudden swelling of your face, hands, or feet   \" New vision problems such as blurring, double vision, or flashing lights  \" A severe headache not relieved with acetaminophen (Tylenol)  \" Sharp or stabbing pain in your right or middle upper abdomen        Remember that labor doesn't have to hurt. Never hesitate to call your midwife or physician or their staff at [facility]/OB at Phone: [phone] for any one of these warning signs - or if something just doesn't feel right. If it's after clinic hours, physician patients should call the [facility] at [phone] ([phone]); midwife patients should call their answering service at [phone].    BREASTFEEDING TIPS FOR NEW MOMS     Importance of skin-to-skin contact:  ? Gets breastfeeding off to a good start  ? Keeps baby warm and is great for bonding  ? Provides " "calming effects and helps to stabilize breathing and  blood sugar  ? Helps the uterus to contract and bleed less    Importance of feeding whenever baby shows signs of  being hungry, \"feeding on cue\":  ? Helps create a good milk supply appropriate to the babys needs  ? Less breastfeeding complications such as engorgement or  low supply  ? Helps baby get enough milk  ? Milk supply is determined by how often baby nurses and empties  the breast.  ? Feeding is for comfort as well as nutrition    Importance of good latch (positioning and attaching  baby properly at breast):  ? Helps prevent sore nipples  ? Helps ensure baby gets enough milk and supports moms breast  milk supply    Risks of giving baby supplements other  than moms breastmilk  Breastfeeding alone is recommended for the first 6 months, if not it:  ? Can make baby more prone to illness  ? Can make baby less satisfied at breast (wanting larger amounts or  faster flow)  ? Reduces milk supply    Importance of rooming-in:  ? Increases parent confidence while mother is supported by the  hospital staff  ? Caregivers learn how to care for baby and recognize feeding cues  ? Enables feeding whenever baby needs to eat  ? Baby is comforted with mom and learns to recognize caregivers    Avoiding use of pacifiers:  ? Use of pacifiers in the first weeks can make it difficult to make a full  milk supply  ? May interfere with baby learning to latch well      2017 [facility] [id number].  [id number]. [facility] 11.17         Breastfeeding   Why Its Important and What to Expect     Your breast milk is the best food for your baby--and  breastfeeding can help you be healthy as well.    Though breastfeeding is natural, it is a learned process for both mother and baby. To prepare, there are things you can learn--and do--before your baby is born.    ? Learn the benefits of breastfeeding.    ? Understand the basic process.    ? Know what to expect in the hospital.    ? Arrange breastfeeding support for " the first few  weeks after birth.    ? Take a breastfeeding class (see back page).    ? Talk to your midwife, nurse or doctor if you have  Questions.    The benefits of breastfeeding    Human milk changes to meet the needs of a growing baby. It is all a baby needs for the first six months of life.    In fact, babies who receive only human milk for the  first six months are less likely to develop colds, the  flu, colic, asthma, ear infections, food allergies and  diarrhea (loose, watery stools). They may be less likely      to be overweight as children, and they are less likely to develop diabetes later in life. Some studies also show that infants have a higher IQ if they are .    Breastfeeding can:    ? Help you and your baby develop a special bond--  and make you feel proud that you can feed your  Baby.    ? Reduce the total amount of blood you will lose  after delivery.    ? Help your uterus return to its non-pregnant size.    ? Reduce the risk of Sudden Infant Death Syndrome (SIDS).    ? Help you lose your pregnancy weight more quickly.    ? Help delay the return of your monthly periods.    ? Lower your risk of some breast and ovarian  cancers--as well as osteoporosis (bone loss)--later in life.    ? Save you more than $300 per month. (This  includes the cost of formula and medical bills.  Formula-fed babies get sick more often.)          If you are deaf or hard of hearing, please let us know. We provide many free services including  sign language interpreters, oral interpreters, TTYs, telephone amplifiers, note takers and written materials.        How to breastfeed    Skin-to-skin contact    Hold your baby on your chest skin-to-skin right after  birth. Skin contact calms your baby, steadies their  breathing and keeps your baby warm. Your baby will be alert and will likely want to feed within the first hour after birth.    Babies are born with reflexes that help them  breastfeed. Your body will be ready  with early milk  (called colostrum), so you will have all the milk your  baby needs for that first feeding. Your nurse will help you get started.    Keep your baby with you and breastfeed whenever  your baby is hungry. Offering the breast early and  often helps your body keep making lots of milk.    How to position your baby    There are many positions for breastfeeding.              No matter which position you choose, support your  babys back, shoulders and neck. The head and body should be in a straight line, and the entire body should face the breast. Your baby should be able to tilt the head back easily. Your baby shouldnt have to reach out to feed. Also make sure your babys nose is level with your nipple. This way, your baby will find it easier to attach to your breast.    Finally, get comfortable. Use pillows to support your  body. Dont lean over or slump to reach your baby.  Once your baby is attached to the breast, its okay to change your position slightly.    You will feel a bit of a tugging at first, but you should  never feel pain. If you do, ask your nurse or lactation expert for help. She will teach you how to latch your baby onto the breast in a way that feels more comfortable.    Breastfeeding in the hospital    For the first three days after birth, your body will  produce early milk called colostrum. This milk is  full of calories and antibodies to help keep your baby healthy. It is all your baby needs for the first few days.    Remember, babies do not eat anything while inside  you. Right after birth, your baby will only need a little bit of milk (about 1 teaspoon per feeding) to get the digestive system working well. Your baby will not need any formula--your body will make the right amount of milk.    Breastfeed whenever your baby shows signs of hunger. (See next page for a list of signs.) Crying is a late sign of hunger.    Babies often lose weight in the days after birth. This  is normal. By two  weeks of age, your baby should be back to their birth weight. Your care team will watch your babys weight carefully.    If you are unable to breastfeed in the hospital, your  care team may suggest pasteurized donor human milk for your baby.               The Most Important Points to Remember    ? Your breast milk is the perfect food for your  baby. Breastfeeding has a lot of health benefits  for you as well.    ? Hold your baby skin-to-skin as soon as possible  after birth. Do this for as long as you can. Even  if your baby doesnt go to the breast right away,  skin-to-skin contact helps your body make  more milk. This lets you get an early start on  Breastfeeding.    ? Learn how to position your baby at the breast.  This will help your baby feed well, and it will  keep you comfortable.    ? Feed your baby whenever your baby wants to  eat. You are feeding a baby, not a clock!    ? Signs that your baby is ready to eat include:  starting to wake up, chewing on fists, moving  the face from side to side, opening and closing  the mouth, sticking out the tongue and turning  toward the breast when held. Crying is a late  sign of hunger, so look for the earlier signals  that your baby makes.    ? Feed your baby only human milk for at least  six months. The World Health Organization  recommends breastfeeding for the first year,  noting it is a deidre baby who is  for  the first two years.    ? While in the hospital, plan to keep your baby  with you at all times, except for certain medical  procedures. This is an important time for you  and your baby to get to know each other and  practice breastfeeding.     Common questions    Below are questions that many women have about  breastfeeding. You will find further information in the  childbirth book your care team gave you. If you have more questions, speak with your midwife, nurse or doctor.    How do I involve my partner, family and  friends and get their help and  support?    Sometimes family and friends dont understand why  you want to breastfeed. Perhaps they themselves  didnt breastfeed, or they werent  as infants. Tell them about the benefits of breastfeeding and how important it is to feed only human milk for the first six months or so. If you feed often, you will make plenty of milk to help your baby grow, fight illness and get the best start possible.    After two to four weeks--once your baby is feeding  well and your milk supply is well established--your  partner and others can feed the baby your milk from  a cup, dropper or bottle. You can remove milk from  your breast (by hand or pump) and store it for later  use. This way, your baby will have your milk even  when youre away.    Remind everyone that there are lots of ways to help  that dont involve feeding: making meals, caring for  your other children, comforting the baby if they cries, changing diapers, running errands and more.    Is breastfeeding painful?    Not usually. There are ways to prevent pain and to  treat it if it happens.    The best ways to avoid pain are to feed your baby  often, use a good position and correctly latch your  baby onto the breast. These help prevent the two  most common sources of pain: sore nipples and  engorgement (overly full breasts). You will learn more about these topics in a breastfeeding class and in the hospital after your baby is born.         How much time does it take to breastfeed?    Some new mothers feel that all they do is breastfeed. In the early weeks, a baby eats 8 to 12 times per day. Sometimes babies will cluster feedings close together. At other times, there are longer stretches between feedings.    Remember, your newborns stomach will be about  the size of a walnut. Your baby wont eat much at each feeding. If you feed your baby often in the first days, your baby--and your milk supply--will grow. Your baby will eat more at each session, and you will  need to nurse less often. Within a few weeks, most women find that breastfeeding is easier and takes less time than formula feeding.    How will I know if my baby is getting  enough milk?    Your body will start making milk as soon as your  baby is born. The more often you put your baby to  breast, the more milk you will make. You should avoid pacifiers for the first several weeks until breastfeeding is well established--you want your baby to do all their sucking at the breast. This will help you make more milk.    There are signs that your baby is getting enough milk. You will learn these signs over time. For example:    ? You will count wet and soiled diapers, because  these show how much milk your baby is getting.    ? Your baby will seem satisfied after feedings.    ? Your baby will grow and gain weight after the first  few days.    Are there reasons why a woman shouldnt  breastfeed her baby?    There are a few medical concerns that prevent  breastfeeding. In mothers, these include being HIVpositive, having active or untreated TB (tuberculosis)  and using street drugs or some medicines. These  mothers usually choose infant formula for their  Babies.    A few women choose not to breastfeed for personal  reasons. But most mothers can breastfeed. If you are not sure if its okay to breastfeed, ask your care team.    Getting support    Before your baby is born, get as much information  as you can. Sign up for a breastfeeding class. Most  childbirth classes discuss breastfeeding, but a special class will give more in-depth information.    ? In the [facility]: Go to [facility]  Center at [employer].    ? In [facility]: Go to [email or web address].  Click on Classes-and Events at the bottom of the  page, then search for breastfeeding. Or, call [phone].    Remember, help is available from your hospital, clinic, lactation experts or online. If you need help after leaving the hospital:    ? Call  your babys clinic. (If you dont have a clinic,  call [phone] and ask for a referral.)    ? Call a lactation consultant. (If you need help  finding a location that offers lactation support, call  [phone].)    ? Call [employer] (24 hours a  day) at [phone]-QP-[phone] [[phone]].    ? Call the Women, Infants and Children (WIC)  program at 1-586.703.6348.    ? Call the National Womens Health Information  Center (English and Tajik) at 1-523.301.6813 or  go to www.womenshealth.gov/breastfeeding.    ? Go to [employer].       For informational purposes only. Not to replace the advice of your health care provider. Copyright   2010 [employer]. All rights reserved. Clinically reviewed by [facility]. [employer] [id number] - REV 06/18.

## 2021-06-19 NOTE — LETTER
Letter by Rachel Garcia CNP at      Author: Rachel Garcia CNP Service: -- Author Type: --    Filed:  Encounter Date: 7/8/2019 Status: (Other)         July 8, 2019     Patient: Kenia Cook   YOB: 1995   Date of Visit: 7/8/2019       To Whom It May Concern:    It is my medical opinion that Kenia Cook may return to light duty immediately with the following restrictions: Must use crutches, allow her to elevate ankle and sit frequently to do so.  May return to work with light duty restrictions on July 9..    If you have any questions or concerns, please don't hesitate to call.    Sincerely,        Electronically signed by Rachel Garcia CNP

## 2021-06-19 NOTE — PROGRESS NOTES
ASSESSMENT/PLAN:  Hypothyroidism  23-year-old female with hypothyroidism here for medication check.  She has been taking levothyroxine 100 mcg dose.  She has been complaining of weight gain.  Will check a TSH and T4 and adjust dose as appropriate    -     Thyroid Stimulating Hormone (TSH)  -     T4, Free    SUBJECTIVE:    Kenia Cook is a 23 y.o. female who came in today for medication check.  She takes levothyroxine 100 mcg for hypothyroidism.  She complains of weight gain over the past years.  Her last TSH was abnormal but with a normal T4.  She denies chest pain, shortness of breath, constipation, chest tightness, fatigue, skin changes, hair changes.  She has a sister with hypothyroidism    Review of Systems (except those mentioned above)  Constitutional: Negative.   HENT: Negative.   Eyes: Negative.   Respiratory: Negative.   Cardiovascular: Negative.   Gastrointestinal: Negative.   Endocrine: Negative.   Genitourinary: Negative.   Musculoskeletal: Negative.   Skin: Negative.   Allergic/Immunologic: Negative.   Neurological: Negative.   Hematological: Negative.   Psychiatric/Behavioral: Negative.     Patient Active Problem List    Diagnosis Date Noted     Allergies      Graves' Disease      Hypothyroidism Postprocedural      No Known Allergies  Current Outpatient Prescriptions   Medication Sig Dispense Refill     SYNTHROID 100 mcg tablet TAKE 1 TABLET BY MOUTH DAILY 30 tablet 0     No current facility-administered medications for this visit.      No past medical history on file.  No past surgical history on file.  Social History     Social History     Marital status:      Spouse name: N/A     Number of children: N/A     Years of education: N/A     Social History Main Topics     Smoking status: Never Smoker     Smokeless tobacco: Never Used     Alcohol use None     Drug use: None     Sexual activity: Not Asked     Other Topics Concern     None     Social History Narrative     No family history on  file.      OBJECTIVE:    Vitals:    07/23/18 1531   BP: 112/76   Pulse: 76   Weight: 171 lb (77.6 kg)     Body mass index is 31.79 kg/(m^2).    Physical Exam:  Constitutional: Patient is oriented to person, place, and time. Patient appears well-developed and well-nourished. No distress.   Head: Normocephalic and atraumatic.   Right Ear: External ear normal.   Left Ear: External ear normal.   Nose: Nose normal.   Mouth/Throat: Oropharynx is clear and moist. No oropharyngeal exudate.   Eyes: Conjunctivae and EOM are normal. Right eye exhibits no discharge. Left eye exhibits no discharge. No scleral icterus.   Neurological: Patient is alert and oriented to person, place, and time. Patient has normal reflexes. No cranial nerve deficit. Coordination normal.   Skin: No rash noted. Patient is not diaphoretic. No erythema. No pallor.      Results for orders placed or performed in visit on 03/23/17   Thyroid Stimulating Hormone (TSH)   Result Value Ref Range    TSH 5.90 (H) 0.30 - 5.00 uIU/mL   T4, Free   Result Value Ref Range    Free T4 1.2 0.7 - 1.8 ng/dL

## 2021-06-20 NOTE — LETTER
Letter by Kenia Valdovinos MD at      Author: Kenia Valdovinos MD Service: -- Author Type: --    Filed:  Encounter Date: 3/12/2020 Status: (Other)         Kenia Cook  [address] [place] [address]             March 12, 2020         Dear Ms. Cook,    Below are the results from your recent visit:    Resulted Orders   Thyroid Stimulating Hormone (TSH)   Result Value Ref Range    TSH 17.60 (H) 0.30 - 5.00 uIU/mL   ABO/RH Typing (OP order)   Result Value Ref Range    HML ABO/Rh Typing B POS     HML ABO/Rh Repeat Typing B POS    Hepatitis B Surface antigen (HBsAG)   Result Value Ref Range    Hepatitis B Surface Ag Negative Negative   HIV Antigen/Antibody Screening Cascade   Result Value Ref Range    HIV Antigen / Antibody Negative Negative    Narrative    Method is Abbott HIV Ag/Ab for the detection of HIV p24 antigen, HIV-1 antibodies and HIV-2 antibodies.   HML Antibody Screen   Result Value Ref Range    HML Antibody Screen Negative    RPR   Result Value Ref Range    Treponema Antibody (Syphilis) Negative Negative   Rubella Immune Status (IgG)   Result Value Ref Range    Rubella Antibody, IgG Positive     Narrative    Negative: Absence of detectable rubella virus IgG antibodies. A negative result presumes that immunity has not been acquired.     Equivocal: Suggest recollection.    Positive: Considered positive for IgG antibodies to rubella virus.   Urinalysis Macroscopic   Result Value Ref Range    Color, UA Yellow Colorless, Yellow, Straw, Light Yellow    Clarity, UA Clear Clear    Glucose, UA Negative Negative    Bilirubin, UA Negative Negative    Ketones, UA Negative Negative    Specific Gravity, UA 1.015 1.005 - 1.030    Blood, UA Trace (!) Negative    pH, UA 7.0 5.0 - 8.0    Protein, UA Negative Negative mg/dL    Urobilinogen, UA 0.2 E.U./dL 0.2 E.U./dL, 1.0 E.U./dL    Nitrite, UA Negative Negative    Leukocytes, UA Negative Negative   Culture, Urine   Result Value Ref Range    Culture No  Growth    HM1 (CBC with Diff)   Result Value Ref Range    WBC 12.4 (H) 4.0 - 11.0 thou/uL    RBC 4.16 3.80 - 5.40 mill/uL    Hemoglobin 12.7 12.0 - 16.0 g/dL    Hematocrit 39.0 35.0 - 47.0 %    MCV 94 80 - 100 fL    MCH 30.5 27.0 - 34.0 pg    MCHC 32.6 32.0 - 36.0 g/dL    RDW 14.3 11.0 - 14.5 %    Platelets 259 140 - 440 thou/uL    MPV 10.8 8.5 - 12.5 fL    Neutrophils % 79 (H) 50 - 70 %    Lymphocytes % 10 (L) 20 - 40 %    Monocytes % 6 2 - 10 %    Eosinophils % 2 0 - 6 %    Basophils % 1 0 - 2 %    Neutrophils Absolute 9.8 (H) 2.0 - 7.7 thou/uL    Lymphocytes Absolute 1.2 0.8 - 4.4 thou/uL    Monocytes Absolute 0.8 0.0 - 0.9 thou/uL    Eosinophils Absolute 0.3 0.0 - 0.4 thou/uL    Basophils Absolute 0.1 0.0 - 0.2 thou/uL   Drugs of Abuse 1,Urine   Result Value Ref Range    Amphetamines Screen Negative Screen Negative    Benzodiazepines Screen Negative Screen Negative    Opiates Screen Negative Screen Negative    Phencyclidine Screen Negative Screen Negative    THC Screen Negative Screen Negative    Barbiturates Screen Negative Screen Negative    Cocaine Metabolite Screen Negative Screen Negative    Oxycodone Screen Negative Screen Negative    Creatinine, Urine 36.6 mg/dL    Narrative    Drug                  Screening Threshold    Amphetamines           1000 ng/mL  Benzodiazepine          200 ng/mL  Opiates                 300 ng/mL  Phencyclidine            25 ng/mL  THC Metabolite           50 ng/mL  Barbiturates            200 ng/mL  Cocaine Metabolite      150 ng/mL  Oxycodone               100 ng/mL    Screening results are to be used only for medical purposes.  Unconfirmed screening results must not be used for non-  medical purposes.   T4, Free   Result Value Ref Range    Free T4 0.9 0.7 - 1.8 ng/dL        Your first OB labs look great. Your blood type is B positive. You will not need Rhogam this pregnancy.    Your TSH thyroid level is elevated. We should increase your Synthroid dose from 112mcg daily to  125mcg daily. I  sent in a new prescription and I would like to recheck your labs in about 4 weeks, at your next OB appointment.     Please call with questions or contact us using [facility].    Sincerely,        Electronically signed by Kenia Valdovinos MD

## 2021-06-21 NOTE — LETTER
Letter by Kenia Valdovinos MD at      Author: Kenia Valdovinos MD Service: -- Author Type: --    Filed:  Encounter Date: 9/16/2020 Status: (Other)         10/22/20          Kenia Cook  [address]  [place] [address]    Dear Kenia,    As a valued [facility] patient, your healthcare needs are our priority. We are contacting you in regards to your missed appointment on 09-14-20. Our clinic records indicate we have attempted to contact you to re-schedule the appointment, but have not heard back from you after three (3) attempts. To prevent further delays in your care please contact our office to re-schedule your appointment as soon as possible.      Sincerely,  Ila An

## 2021-06-22 NOTE — TELEPHONE ENCOUNTER
Left message to call back for: Kenia  Information to relay to patient:    Pls ask her to f/u with me as soon as she is able. Trinidad Meza, David Gomez MD

## 2021-06-22 NOTE — TELEPHONE ENCOUNTER
Patient Returning Call  Reason for call:   Patient returning phone call from clinic.  Information relayed to patient:  Relayed below message, patient verbalized understanding and scheduled follow up appointment.  Patient has additional questions:  No  If YES, what are your questions/concerns:  none  Okay to leave a detailed message?: Yes

## 2021-06-23 ENCOUNTER — COMMUNICATION - HEALTHEAST (OUTPATIENT)
Dept: FAMILY MEDICINE | Facility: CLINIC | Age: 26
End: 2021-06-23

## 2021-06-23 DIAGNOSIS — E03.9 ACQUIRED HYPOTHYROIDISM: ICD-10-CM

## 2021-06-23 RX ORDER — LEVOTHYROXINE SODIUM 125 UG/1
TABLET ORAL
Qty: 90 TABLET | Refills: 2 | Status: SHIPPED | OUTPATIENT
Start: 2021-06-23 | End: 2022-09-29

## 2021-06-23 NOTE — TELEPHONE ENCOUNTER
Pt complaint of her Rt side back of head hurts with moving since last TH that comes and goes  Hurts more with coughing, sneezing turning the head to rt side or if putting or putting pressure on it or bearing down  No lump noted on scalp  No other sx. No vomiting, no fever  Pt prefers [facility] as she cannot come in until 530 pm  Offered appt and she prefer [facility]    Sarah Pink, RN [facility] RN Triage      Reason for Disposition    Patient wants to be seen    Unexplained headache that is present > 24 hours    Protocols used: HEADACHE-A-OH

## 2021-06-23 NOTE — PROGRESS NOTES
ASSESSMENT/PLAN:  Acquired hypothyroidism  Has been out of levothyroxine 112 mcg for the last couple days.  Patient is due for refills.  Will check TSH and T4.  -     Thyroid Stimulating Hormone (TSH)  -     T4, Free    Encounter for administration of vaccine  -     influenza vacc quad (6mos+) (PF) 2018-19 injection 0.5 mL (FLUZONE; FLUARIX); Inject 0.5 mL into the shoulder, thigh, or buttocks Once prior to discharge.    SUBJECTIVE:    Kenia Cook is a 23 y.o. female who came in today for a medication management visit. Her last dose of Levothyroxine (112 mcg) was 2 days ago. She takes her medication every morning on an empty stomach and waits 30 minutes before eating. Pt reports her menstrual periods are normal. Pt would like a flu shot today.     Review of Systems (except those mentioned above)  Constitutional: Negative.   HENT: Negative.   Eyes: Negative.   Respiratory: Negative.   Cardiovascular: Negative.   Gastrointestinal: Negative.   Endocrine: Negative.   Genitourinary: Negative.   Musculoskeletal: Negative.   Skin: Negative.   Allergic/Immunologic: Negative.   Neurological: Negative.   Hematological: Negative.   Psychiatric/Behavioral: Negative.     Patient Active Problem List    Diagnosis Date Noted     Allergies      Graves' Disease      Hypothyroidism Postprocedural      No Known Allergies  Current Outpatient Medications   Medication Sig Dispense Refill     SYNTHROID 112 mcg tablet TAKE 1 TABLET BY MOUTH EVERY DAY 30 tablet 0     Current Facility-Administered Medications   Medication Dose Route Frequency Provider Last Rate Last Dose     [START ON 1/22/2019] influenza vacc quad (6mos+) (PF) 2018-19 injection 0.5 mL (FLUZONE; FLUARIX)  0.5 mL Intramuscular Prior to Discharge David Chavez MD         No past medical history on file.  No past surgical history on file.  Social History     Socioeconomic History     Marital status:      Spouse name: None     Number of children: None      Years of education: None     Highest education level: None   Social Needs     Financial resource strain: None     Food insecurity - worry: None     Food insecurity - inability: None     Transportation needs - medical: None     Transportation needs - non-medical: None   Occupational History     None   Tobacco Use     Smoking status: Never Smoker     Smokeless tobacco: Never Used   Substance and Sexual Activity     Alcohol use: None     Drug use: None     Sexual activity: None   Other Topics Concern     None   Social History Narrative     None     No family history on file.    OBJECTIVE:    Vitals:    01/21/19 1252   BP: 112/60   Pulse: 80   Weight: 173 lb 9 oz (78.7 kg)     Body mass index is 32.26 kg/m .    Physical Exam:  Constitutional: Patient is oriented to person, place, and time. Patient appears well-developed and well-nourished. No distress.   Head: Normocephalic and atraumatic.   Right Ear: External ear normal.   Left Ear: External ear normal.   Nose: Nose normal.   Eyes: Conjunctivae and EOM are normal. Right eye exhibits no discharge. Left eye exhibits no discharge. No scleral icterus.   Neurological: Patient is alert and oriented to person, place, and time. Patient has normal reflexes. No cranial nerve deficit. Coordination normal.   Skin: No rash noted. Patient is not diaphoretic. No erythema. No pallor.    Results for orders placed or performed in visit on 07/23/18   Thyroid Stimulating Hormone (TSH)   Result Value Ref Range    TSH 27.20 (H) 0.30 - 5.00 uIU/mL   T4, Free   Result Value Ref Range    Free T4 0.8 0.7 - 1.8 ng/dL     ADDITIONAL HISTORY SUMMARIZED (2): None.   DECISION TO OBTAIN EXTRA INFORMATION (1): None.   RADIOLOGY TESTS (1): None.  LABS (1): Labs ordered today.   MEDICINE TESTS (1): None.  INDEPENDENT REVIEW (2 each): None.     Total data points = 1    Total time was 7 minutes additional to the preventive, greater than 50% counseling and coordinating care regarding medication  management.    By signing my name below, I, Ariana Jeong, attest that this documentation has been prepared under the direction and in the presence of Dr. Patricia Abdi.  Electronic Signature: Renetta Naidu. 01/21/2019 13:11.    I, Dr. David Meza MD , personally performed the services described in this documentation. All medical record entries made by the scribe were at my direction and in my presence. I have reviewed the chart and discharge instructions (if applicable) and agree that the record reflects my personal performance and is accurate and complete.

## 2021-06-23 NOTE — PATIENT INSTRUCTIONS - HE
Ice packs to the muscle as needed  Ibuprofen three times a day with food  Continue to drink water but add a drink with electrolytes once a day or so  Stretching  Assess work place to limit strain  Follow up if worse or no better - sooner if fever, vision changes, dizziness, vomiting

## 2021-06-23 NOTE — PROGRESS NOTES
Assessment/Plan:   Muscle spasm, right occipital region  Muscle cramping vs neuralgia occipital region. By history more consistent with a muscle cramp and release sensation. Exam normal. No pain at this time. Discussed some lab work but she would like to try conservative measures discussed below and follow up with primary provider if not improving. Will try muscle relaxant at night.  - cyclobenzaprine (FLEXERIL) 5 MG tablet; 1-2 tabs at bedtime and every 8 hours if needed for muscle spasm  Dispense: 20 tablet; Refill: 0  - ibuprofen (ADVIL,MOTRIN) 600 MG tablet; Take 1 tablet (600 mg total) by mouth every 8 (eight) hours as needed for pain (with food).  Dispense: 60 tablet; Refill: 0    Ice packs to the muscle as needed  Ibuprofen three times a day with food  Continue to drink water but add a drink with electrolytes once a day or so  Stretching  Assess work place to limit strain  Follow up if worse or no better - sooner if fever, vision changes, dizziness, vomiting    Subjective:      Kenia Cook is a 23 y.o. female who presents with intermittent muscle cramps in the muscles of her occiput and neck. These started last Friday, about 5 days ago. The sensation is a sharp pain similar to a zachary horse cramp in the foot but on her posterior scalp and neck. It might be triggered by a movement and also relieved by movement or stretch the opposite way.  They are very short lived. Initially occurred off and on Friday and then again more frequently from yesterday to today and seem worse. There was no injury or change in activities or exercise, no known strain or overuse. She exercises on the treadmill and does light weights three times a week but this is not new.  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 She is currently doing some training so spending more time at the desk computer but a little off kilter and looking more to one side. Also more general stress lately. No N/V/D. No cramps  elsewhere in legs or feet.  No fever or chills, no uRI or sinus or cough no shortness of breath or CP. No rash or paresthesias. Drinks a lot of water each day. NKDA    Current Outpatient Medications on File Prior to Visit   Medication Sig Dispense Refill     levothyroxine (SYNTHROID) 112 MCG tablet Take 1 tablet (112 mcg total) by mouth daily. 90 tablet 3     No current facility-administered medications on file prior to visit.      Patient Active Problem List   Diagnosis     Allergies     Graves' Disease     Hypothyroidism Postprocedural       Objective:     /88 (Patient Site: Right Arm, Patient Position: Sitting, Cuff Size: Adult Regular)   Pulse 77   Temp 98  F (36.7  C) (Oral)   Resp 16   Wt 169 lb 4.8 oz (76.8 kg)   SpO2 98%   BMI 31.47 kg/m      Physical  General Appearance: Alert, cooperative, no distress  Head: Normocephalic, without obvious abnormality, atraumatic.  CN 2-12 intact  Eyes: Conjunctivae are normal. Extraocular movements are intact. No nystagmus, PERRLA  Ears: Normal TMs and external ear canals, both ears  Nose: No significant congestion.  Throat: Throat is normal.  No exudate.  No significant lesions  Neck: No adenopathy; thyroid not enlarged, nodular or tender, neck is supple with full range of motion  Lungs: Clear to auscultation bilaterally, respirations unlabored  Heart: Regular rate and rhythm  Extremities: No lower extremity edema, no calf tenderness. DTRs symmetric, strength intact, no fasciculations or clonus  Skin: Skin color, texture, turgor normal, no rashes or lesions  Psychiatric: Patient has a normal mood and affect.

## 2021-06-24 NOTE — TELEPHONE ENCOUNTER
RN cannot approve Refill Request    RN can NOT refill this medication med is not covered by policy/route to provider. Last office visit: 2/6/2019 Lisa Schwab MD Last Physical: Visit date not found Last MTM visit: Visit date not found Last visit same specialty: 2/6/2019 Lisa Schwab MD.  Next visit within 3 mo: Visit date not found  Next physical within 3 mo: Visit date not found      Amada Contreras, [facility] Triage/Med Refill 2/23/2019    Requested Prescriptions   Pending Prescriptions Disp Refills     ibuprofen (ADVIL,MOTRIN) 600 MG tablet [Pharmacy Med Name: IBUPROFEN 600 MG TABLET] 60 tablet 0     Sig: TAKE 1 TABLET BY MOUTH EVERY 8 HOURS WITH FOOD AS NEEDED FOR PAIN    There is no refill protocol information for this order

## 2021-06-26 NOTE — PROGRESS NOTES
ASSESSMENT/PLAN:  Kenia was seen today for cyst.    Diagnoses and all orders for this visit:    Rash and nonspecific skin eruption  Abscess right labia majora  Will treat with augmentin  Advised ice pack (or warm compress) to help with pain and swelling  F/u if not better after 2 wks, soon if with worsening symptoms  -     amoxicillin-clavulanate (AUGMENTIN) 875-125 mg per tablet; Take 1 tablet by mouth 2 (two) times a day for 10 days.    SUBJECTIVE:    Kenia Cook is a 26 y.o. female who came in today     Swollen and painful bump noted on right labia for 2.5 days  No drainage noted  Painful when clothes rub on it, especially wearing pants and underwear  Has never had this before    Review of Systems (except those mentioned above)  Constitutional: Negative.   HENT: Negative.   Eyes: Negative.   Respiratory: Negative.   Cardiovascular: Negative.   Gastrointestinal: Negative.   Endocrine: Negative.   Genitourinary: Negative.   Musculoskeletal: Negative.   Skin: Negative.   Allergic/Immunologic: Negative.   Neurological: Negative.   Hematological: Negative.   Psychiatric/Behavioral: Negative.     Patient Active Problem List    Diagnosis Date Noted     Pregnant 2020      (normal spontaneous vaginal delivery)      First degree perineal laceration      Positive GBS test 2020     LSIL on pap with HPV negative 3/2020 currently pregnant, needs colposcopy 6 wks pp 2020     Supervision of other normal pregnancy, antepartum 2020     Postprocedural hypothyroidism with hx of Graves s/p Iodine ablation      No Known Allergies  Current Outpatient Medications   Medication Sig Dispense Refill     acetaminophen (TYLENOL) 325 MG tablet Take 1-2 tablets (325-650 mg total) by mouth every 4 (four) hours as needed.  0     docusate sodium (COLACE) 100 MG capsule Take 1 capsule (100 mg total) by mouth daily.  0     ibuprofen (ADVIL,MOTRIN) 800 MG tablet Take 1 tablet (800 mg total) by mouth every 8 (eight)  hours. 30 tablet 0     levothyroxine (SYNTHROID, LEVOTHROID) 125 MCG tablet TAKE 1 TABLET (125 MCG TOTAL) BY MOUTH DAILY. RECHECK TSH AT YOUR NEXT OB APPOINTMENT. 90 tablet 3     prenatal vit 91-iron-folic-dha (PRENATAL + DHA) 28 mg iron- 975 mcg-200 mg Cmpk Take 1 each by mouth daily. 90 each 4     amoxicillin-clavulanate (AUGMENTIN) 875-125 mg per tablet Take 1 tablet by mouth 2 (two) times a day for 10 days. 20 tablet 0     No current facility-administered medications for this visit.      Past Medical History:   Diagnosis Date     Hx of Graves' disease     Low TSH and nuclear updtake scan 3/15/12 c/w Graves.  Received 13 mCi of I-131 ablation on 11/20/2013. On synthroid since that time.     No past surgical history on file.  Social History     Socioeconomic History     Marital status: Single     Spouse name: None     Number of children: None     Years of education: None     Highest education level: None   Occupational History     None   Social Needs     Financial resource strain: None     Food insecurity     Worry: None     Inability: None     Transportation needs     Medical: None     Non-medical: None   Tobacco Use     Smoking status: Never Smoker     Smokeless tobacco: Never Used   Substance and Sexual Activity     Alcohol use: Not Currently     Drug use: Never     Sexual activity: Yes     Partners: Male     Birth control/protection: None   Lifestyle     Physical activity     Days per week: None     Minutes per session: None     Stress: None   Relationships     Social connections     Talks on phone: None     Gets together: None     Attends Spiritism service: None     Active member of club or organization: None     Attends meetings of clubs or organizations: None     Relationship status: None     Intimate partner violence     Fear of current or ex partner: None     Emotionally abused: None     Physically abused: None     Forced sexual activity: None   Other Topics Concern     None   Social History Narrative     Works for [employer], from home currently.     - Ivan Valderrama. 6yo and 10yo kids.     No smoking or alcohol.    Kenia Valdovinos MD     No family history on file.      OBJECTIVE:    Vitals:    06/10/21 1506   BP: 118/80   Patient Site: Left Arm   Patient Position: Sitting   Cuff Size: Adult Regular   Pulse: 94   Temp: 98.1  F (36.7  C)   TempSrc: Oral   SpO2: 97%   Weight: 181 lb 3.2 oz (82.2 kg)     Body mass index is 34.24 kg/m .    Physical Exam:  Constitutional: Patient is oriented to person, place, and time. Patient appears well-developed and well-nourished. No distress.   Head: Normocephalic and atraumatic.   Right Ear: External ear normal.   Left Ear: External ear normal.   Eyes: Conjunctivae and EOM are normal. Right eye exhibits no discharge. Left eye exhibits no discharge. No scleral icterus.   Pelvic:  2.5cm by 1 cm red, swollen, warm to touch, and tender to touch abscess along the right labia majora. No drainage.  No lymph node involvement

## 2021-06-26 NOTE — TELEPHONE ENCOUNTER
RN cannot approve Refill Request    RN can NOT refill this medication PAtient has been seen for acute reasons, last annual office visit was over a year ago. Last office visit: 1/3/2020 Kenia Valdovinos MD Last Physical: Visit date not found Last MTM visit: Visit date not found Last visit same specialty: 6/10/2021 David Chavez MD.  Next visit within 3 mo: Visit date not found  Next physical within 3 mo: Visit date not found      Viviana Munoz, [facility] Triage/Med Refill 6/23/2021    Requested Prescriptions   Pending Prescriptions Disp Refills     levothyroxine (SYNTHROID, LEVOTHROID) 125 MCG tablet [Pharmacy Med Name: LEVOTHYROXINE 125 MCG TABLET] 90 tablet 2     Sig: TAKE 1 TABLET BY MOUTH EVERY DAY       Thyroid Hormones Protocol Passed - 6/22/2021  7:27 AM        Passed - Provider visit in past 12 months or next 3 months     Last office visit with prescriber/PCP: 1/3/2020 Kenia Valdovinos MD OR same dept: 6/10/2021 David Chavez MD OR same specialty: 6/10/2021 David Chavez MD  Last physical: Visit date not found Last MTM visit: Visit date not found   Next visit within 3 mo: Visit date not found  Next physical within 3 mo: Visit date not found  Prescriber OR PCP: Kenia Valdovinos MD  Last diagnosis associated with med order: 1. Acquired hypothyroidism  - levothyroxine (SYNTHROID, LEVOTHROID) 125 MCG tablet [Pharmacy Med Name: LEVOTHYROXINE 125 MCG TABLET]; TAKE 1 TABLET BY MOUTH EVERY DAY  Dispense: 90 tablet; Refill: 2    If protocol passes may refill for 12 months if within 3 months of last provider visit (or a total of 15 months).             Passed - TSH on file in past 12 months for patient age 12 & older     TSH   Date Value Ref Range Status   07/16/2020 0.77 0.30 - 5.00 uIU/mL Final

## 2021-06-27 ENCOUNTER — HEALTH MAINTENANCE LETTER (OUTPATIENT)
Age: 26
End: 2021-06-27

## 2021-06-27 NOTE — PROGRESS NOTES
Progress Notes by Rachel Garcia CNP at 7/8/2019 12:20 PM     Author: Rachel Garcia CNP Service: -- Author Type: Nurse Practitioner    Filed: 7/15/2019  9:08 AM Encounter Date: 7/8/2019 Status: Signed    : Rachel Garcia CNP (Nurse Practitioner)       Chief Complaint   Patient presents with   ? Ankle Injury     was playing volleyball this weekend and sprained the left ankle x2 days per pt       ASSESSMENT & PLAN:   Diagnoses and all orders for this visit:    Pain in joint involving ankle and foot, left  -     Cancel: XR Ankle Left 3 or More VWS  -     XR Ankle Left 3 or More VWS    Sprain of left ankle, unspecified ligament, initial encounter  -     Splint    Multiple abrasions        MDM:  Crutches, ice, elevation, aircast, ibuprofen or Tylenol.  Follow up in about 7-10 days at primary care clinic if still unable to bear full weight.  Bacitracin to abrasions.  Avoid submerging legs in water/lake/pool. Showers only.      Supportive care discussed.  See discharge instructions below for specific recommendations given.    At the end of the encounter, I discussed results, diagnosis, medications. Discussed red flags for immediate return to clinic/ER, as well as indications for follow up if no improvement. Patient and/or caregiver understood and agreed to plan. Patient was stable for discharge.    SUBJECTIVE    HPI:  HPI  Kenia Cook presents to the walk-in clinic with left medial and lateral ankle pain and swelling starting while playing volleyball 2 days ago.  Tripped and fell.  Abrasions to ankle, rt knee.  Scraped on cement.    Can't bear weight - using crutches from home.      History obtained from the patient.    No past medical history on file.    Active Ambulatory (Non-Hospital) Problems    Diagnosis   ? Allergies   ? Graves' Disease   ? Hypothyroidism Postprocedural         Social History     Tobacco Use   ? Smoking status: Never Smoker   ? Smokeless tobacco: Never Used   Substance Use Topics    ? Alcohol use: Not on file       Review of Systems   All other systems reviewed and are negative.      OBJECTIVE    Vitals:    07/08/19 1322   BP: 134/85   Patient Site: Right Arm   Patient Position: Sitting   Cuff Size: Adult Regular   Pulse: (!) 105   Resp: 16   Temp: 98.6  F (37  C)   TempSrc: Oral   SpO2: 98%   Weight: 156 lb 2 oz (70.8 kg)       Physical Exam   Constitutional: She is oriented to person, place, and time. She appears well-developed and well-nourished. No distress.   HENT:   Right Ear: External ear normal.   Left Ear: External ear normal.   Eyes: Conjunctivae are normal. Right eye exhibits no discharge. Left eye exhibits no discharge.   Cardiovascular: Intact distal pulses.   Pulmonary/Chest: Effort normal.   Musculoskeletal: Normal range of motion. She exhibits edema (generalized lt foot including medial and lateral ankles ) and tenderness (tender inferior tip lt medial malleolus.). She exhibits no deformity.   Neurological: She is alert and oriented to person, place, and time.   Skin: Skin is warm and dry. Capillary refill takes less than 2 seconds.   Abrasions - see HPI.  No s/s infection.  Granulation tissue noted right knee abrasion.    Psychiatric: She has a normal mood and affect. Her behavior is normal. Judgment and thought content normal.       Labs:  No results found for this or any previous visit (from the past 240 hour(s)).      Radiology:    Xr Ankle Left 3 Or More Vws    Result Date: 7/8/2019  EXAM DATE:         07/08/2019 EXAM: X-RAY ANKLE LEFT, MINIMUM THREE VIEWS LOCATION: [facility] DATE/TIME: 7/8/2019 2:15 PM INDICATION: ankle injury - tender tip medial malleolus COMPARISON: None. FINDINGS: No fracture or dislocation. Mild soft tissue swelling around the ankle. The ankle mortise is intact.       PATIENT INSTRUCTIONS:   Patient Instructions     Wear your Aircast.  Use crutches.  Minimal to no weightbearing.    Avoid soaking your ankle wound in bath water.   Please take showers only.    Once daily, wash gently with soap and water, apply thin layer bacitracin and nonadherent dressing.  Watch for signs of infection including expanding redness.    If you can jump on your affected foot 10 times without pain, you can get rid of the crutches.  If you are still having a lot of pain in a week, please see your doctor for a recheck.    Patient Education     Treating Ankle Sprains  Treatment will depend on how bad your sprain is. For a severe sprain, healing may take 3 months or more.  Right after your injury: Use R.I.C.E.    Rest: At first, keep weight off the ankle as much as you can. You may be given crutches to help you walk without putting weight on the ankle.    Ice: Put an ice pack on the ankle for 20 minutes. Remove the pack and wait at least 30 minutes. Repeat for up to 3 days. This helps reduce swelling.    Compression: To reduce swelling and keep the joint stable, you may need to wrap the ankle with an elastic bandage. For more severe sprains, you may need an ankle brace, a boot, or a cast.    Elevation: To reduce swelling, keep your ankle raised above your heart when you sit or lie down.  Medicine  Your healthcare provider may suggest oral nonsteroidal anti-inflammatory medicine (NSAIDs), such as ibuprofen. This relieves the pain and helps reduce swelling. Be sure to take your medicine as directed.  Exercises    After about 2 to 3 weeks, you may be given exercises to strengthen the ligaments and muscles in the ankle. Doing these exercises will help prevent another ankle sprain. Exercises may include standing on your toes and then on your heels and doing ankle curls.  Ankle curls    Sit on the edge of a sturdy table or lie on your back.    Pull your toes toward you. Then point them away from you. Repeat for 2 to 3 minutes.   Date Last Reviewed: 1/1/2018 2000-2017 [employer]. [address] [facility], [place] [address]. All rights reserved. This  information is not intended as a substitute for professional medical care. Always follow your healthcare professional's instructions.

## 2021-07-03 NOTE — ADDENDUM NOTE
Addendum Note by David Vines MD at 1/21/2019  1:00 PM     Author: David Vines MD Service: -- Author Type: Physician    Filed: 1/22/2019  7:21 AM Encounter Date: 1/21/2019 Status: Signed    : David Vines MD (Physician)    Addended by: DAVID VINES on: 1/22/2019 07:21 AM        Modules accepted: Orders

## 2021-07-06 VITALS
WEIGHT: 181.2 LBS | OXYGEN SATURATION: 97 % | SYSTOLIC BLOOD PRESSURE: 118 MMHG | DIASTOLIC BLOOD PRESSURE: 80 MMHG | HEART RATE: 94 BPM | TEMPERATURE: 98.1 F | BODY MASS INDEX: 34.24 KG/M2

## 2021-07-14 PROBLEM — Z34.90 PREGNANT: Status: RESOLVED | Noted: 2020-08-03 | Resolved: 2021-06-10

## 2021-07-14 PROBLEM — Z34.80 SUPERVISION OF OTHER NORMAL PREGNANCY, ANTEPARTUM: Status: RESOLVED | Noted: 2020-03-09 | Resolved: 2021-06-10

## 2021-10-17 ENCOUNTER — HEALTH MAINTENANCE LETTER (OUTPATIENT)
Age: 26
End: 2021-10-17

## 2022-05-31 DIAGNOSIS — E03.9 ACQUIRED HYPOTHYROIDISM: ICD-10-CM

## 2022-05-31 RX ORDER — LEVOTHYROXINE SODIUM 125 UG/1
TABLET ORAL
Qty: 90 TABLET | Refills: 0 | Status: SHIPPED | OUTPATIENT
Start: 2022-05-31 | End: 2022-09-15

## 2022-07-24 ENCOUNTER — HEALTH MAINTENANCE LETTER (OUTPATIENT)
Age: 27
End: 2022-07-24

## 2022-09-13 ENCOUNTER — MYC REFILL (OUTPATIENT)
Dept: FAMILY MEDICINE | Facility: CLINIC | Age: 27
End: 2022-09-13

## 2022-09-13 DIAGNOSIS — E03.9 ACQUIRED HYPOTHYROIDISM: ICD-10-CM

## 2022-09-14 RX ORDER — LEVOTHYROXINE SODIUM 125 UG/1
TABLET ORAL
Qty: 90 TABLET | Refills: 0 | OUTPATIENT
Start: 2022-09-14

## 2022-09-14 NOTE — TELEPHONE ENCOUNTER
"Routing refill request to provider for review/approval because:  Labs not current:  TSH  Patient needs to be seen because it has been more than 1 year since last office visit.    Last Written Prescription Date:  5/31/22  Last Fill Quantity: 90,  # refills: 0   Last office visit provider:  6/10/21 - scheduled 10/5/22    Requested Prescriptions   Pending Prescriptions Disp Refills     levothyroxine (SYNTHROID/LEVOTHROID) 125 MCG tablet 90 tablet 0     Sig: [LEVOTHYROXINE (SYNTHROID, LEVOTHROID) 125 MCG TABLET] TAKE 1 TABLET (125 MCG TOTAL) BY MOUTH DAILY.       Thyroid Protocol Failed - 9/14/2022  1:14 PM        Failed - Normal TSH on file in past 12 months     Recent Labs   Lab Test 07/16/20  1420   TSH 0.77              Passed - Patient is 12 years or older        Passed - Recent (12 mo) or future (30 days) visit within the authorizing provider's specialty     Patient has had an office visit with the authorizing provider or a provider within the authorizing providers department within the previous 12 mos or has a future within next 30 days. See \"Patient Info\" tab in inbasket, or \"Choose Columns\" in Meds & Orders section of the refill encounter.              Passed - Medication is active on med list        Passed - No active pregnancy on record     If patient is pregnant or has had a positive pregnancy test, please check TSH.          Passed - No positive pregnancy test in past 12 months     If patient is pregnant or has had a positive pregnancy test, please check TSH.               Solo Jones RN 09/14/22 1:14 PM  "

## 2022-09-15 RX ORDER — LEVOTHYROXINE SODIUM 125 UG/1
TABLET ORAL
Qty: 90 TABLET | Refills: 0 | Status: SHIPPED | OUTPATIENT
Start: 2022-09-15 | End: 2022-09-29

## 2022-09-29 ENCOUNTER — OFFICE VISIT (OUTPATIENT)
Dept: FAMILY MEDICINE | Facility: CLINIC | Age: 27
End: 2022-09-29
Payer: COMMERCIAL

## 2022-09-29 VITALS
OXYGEN SATURATION: 99 % | SYSTOLIC BLOOD PRESSURE: 110 MMHG | WEIGHT: 164 LBS | BODY MASS INDEX: 30.99 KG/M2 | HEART RATE: 72 BPM | DIASTOLIC BLOOD PRESSURE: 78 MMHG

## 2022-09-29 DIAGNOSIS — E03.9 ACQUIRED HYPOTHYROIDISM: ICD-10-CM

## 2022-09-29 LAB
T3FREE SERPL-MCNC: 2.7 PG/ML (ref 2–4.4)
T4 FREE SERPL-MCNC: 1.65 NG/DL (ref 0.9–1.7)
TSH SERPL DL<=0.005 MIU/L-ACNC: 1.17 UIU/ML (ref 0.3–4.2)

## 2022-09-29 PROCEDURE — 84439 ASSAY OF FREE THYROXINE: CPT | Performed by: FAMILY MEDICINE

## 2022-09-29 PROCEDURE — 84481 FREE ASSAY (FT-3): CPT | Performed by: FAMILY MEDICINE

## 2022-09-29 PROCEDURE — 36415 COLL VENOUS BLD VENIPUNCTURE: CPT | Performed by: FAMILY MEDICINE

## 2022-09-29 PROCEDURE — 84443 ASSAY THYROID STIM HORMONE: CPT | Performed by: FAMILY MEDICINE

## 2022-09-29 PROCEDURE — 99213 OFFICE O/P EST LOW 20 MIN: CPT | Performed by: FAMILY MEDICINE

## 2022-09-29 RX ORDER — LEVOTHYROXINE SODIUM 125 UG/1
TABLET ORAL
Qty: 90 TABLET | Refills: 4 | Status: SHIPPED | OUTPATIENT
Start: 2022-09-29 | End: 2023-11-20

## 2022-09-29 NOTE — PROGRESS NOTES
Assessment & Plan     Acquired hypothyroidism  - levothyroxine (SYNTHROID/LEVOTHROID) 125 MCG tablet  Dispense: 90 tablet; Refill: 4  - T4 free  - T3 Free  - TSH  Follow-up yearly    David Meza MD  [facility] STEPHAN Cr is a 27 year old presenting for the following health issues:  Recheck Medication      HPI     Hypothyroidism Follow-up      Since last visit, patient describes the following symptoms: Weight stable, no hair loss, no skin changes, no constipation, no loose stools      How many servings of fruits and vegetables do you eat daily?  4 or more    On average, how many sweetened beverages do you drink each day (Examples: soda, juice, sweet tea, etc.  Do NOT count diet or artificially sweetened beverages)?   0    How many days per week do you exercise enough to make your heart beat faster? 4    How many minutes a day do you exercise enough to make your heart beat faster? 30 - 60    How many days per week do you miss taking your medication? 0    Here for med check  No concerns  I reviewed her chart: TSH 0.77 on 7/16/20        Objective    /78 (BP Location: Left arm, Patient Position: Sitting, Cuff Size: Adult Regular)   Pulse 72   Wt 74.4 kg (164 lb)   LMP 09/13/2022 (Exact Date)   SpO2 99%   Breastfeeding No   BMI 30.99 kg/m    Body mass index is 30.99 kg/m .  Physical Exam   Constitutional: Patient is oriented to person, place, and time. Patient appears well-developed and well-nourished. No distress.   Head: Normocephalic and atraumatic.   Right Ear: External ear normal.   Left Ear: External ear normal.   Eyes: Conjunctivae and EOM are normal. Right eye exhibits no discharge. Left eye exhibits no discharge. No scleral icterus.   Neurological: Patient is alert and oriented to person, place, and time.  Skin: No rash noted. Patient is not diaphoretic. No erythema. No pallor.

## 2022-10-02 ENCOUNTER — HEALTH MAINTENANCE LETTER (OUTPATIENT)
Age: 27
End: 2022-10-02

## 2023-08-12 ENCOUNTER — HEALTH MAINTENANCE LETTER (OUTPATIENT)
Age: 28
End: 2023-08-12

## 2024-02-19 DIAGNOSIS — E03.9 ACQUIRED HYPOTHYROIDISM: ICD-10-CM

## 2024-02-19 RX ORDER — LEVOTHYROXINE SODIUM 125 UG/1
TABLET ORAL
Qty: 90 TABLET | Refills: 0 | OUTPATIENT
Start: 2024-02-19

## 2024-02-19 NOTE — TELEPHONE ENCOUNTER
Refill Request  Medication name: Pending Prescriptions:                       Disp   Refills    levothyroxine (SYNTHROID/LEVOTHROID) 125 *90 tab*0            Sig: TAKE 1 TABLET (125 MCG TOTAL) BY MOUTH DAILY.    Requested Pharmacy:       [id number]/PHARMACY #[id number] - [place] - [address]

## 2024-06-14 DIAGNOSIS — E03.9 ACQUIRED HYPOTHYROIDISM: ICD-10-CM

## 2024-06-14 RX ORDER — LEVOTHYROXINE SODIUM 125 UG/1
TABLET ORAL
Qty: 90 TABLET | Refills: 0 | OUTPATIENT
Start: 2024-06-14

## 2024-06-14 NOTE — TELEPHONE ENCOUNTER
Pending Prescriptions:                       Disp   Refills    levothyroxine (SYNTHROID/LEVOTHROID) 125 *90 tab*0            Sig: TAKE 1 TABLET (125 MCG TOTAL) BY MOUTH DAILY.    Pharmacy:    [facility]/PHARMACY #[id number] - [place] - [address]

## 2024-07-11 ENCOUNTER — VIRTUAL VISIT (OUTPATIENT)
Dept: FAMILY MEDICINE | Facility: CLINIC | Age: 29
End: 2024-07-11
Payer: COMMERCIAL

## 2024-07-11 DIAGNOSIS — E03.9 ACQUIRED HYPOTHYROIDISM: ICD-10-CM

## 2024-07-11 PROCEDURE — 99213 OFFICE O/P EST LOW 20 MIN: CPT | Mod: 95 | Performed by: FAMILY MEDICINE

## 2024-07-11 RX ORDER — LEVOTHYROXINE SODIUM 125 UG/1
TABLET ORAL
Qty: 90 TABLET | Refills: 0 | Status: SHIPPED | OUTPATIENT
Start: 2024-07-11 | End: 2024-09-23

## 2024-07-11 NOTE — PROGRESS NOTES
Shaye is a 29 year old who is being evaluated via a billable video visit.    How would you like to obtain your AVS? MyChart  If the video visit is dropped, the invitation should be resent by: Text to cell phone: [phone]  Will anyone else be joining your video visit? No        ICD-10-CM    1. Acquired hypothyroidism  E03.9 levothyroxine (SYNTHROID/LEVOTHROID) 125 MCG tablet     TSH with free T4 reflex     CANCELED: TSH with free T4 reflex        New to this provider  Patient has not seen her provider in 2 years and has been getting thyroid med refilled until today.  She is tolerating it well, no symptoms.  She needs lab check, she is in the process of trying to establish care.  Refilled for 3 months, call if needed to have it extended.  Advised making appoint this week as many providers book out months for new patients.  She is understanding  Follow up for physical     Subjective   Shaye is a 29 year old, presenting for the following health issues:  Refill Request      7/11/2024    12:58 PM   Additional Questions   Roomed by kaitlin     History of Present Illness       Reason for visit:  Follow up on medication    She eats 2-3 servings of fruits and vegetables daily.She consumes 0 sweetened beverage(s) daily.She exercises with enough effort to increase her heart rate 20 to 29 minutes per day.  She exercises with enough effort to increase her heart rate 4 days per week.   She is taking medications regularly.       Hypothyroidism Follow-up    Since last visit, patient describes the following symptoms: Weight stable, no hair loss, no skin changes, no constipation, no loose stools        Review of Systems  Constitutional, HEENT, cardiovascular, pulmonary, GI, , musculoskeletal, neuro, skin, endocrine and psych systems are negative, except as otherwise noted.      Objective           Vitals:  No vitals were obtained today due to virtual visit.    Physical Exam   GENERAL: alert and no distress  EYES: Eyes grossly  normal to inspection.  No discharge or erythema, or obvious scleral/conjunctival abnormalities.  RESP: No audible wheeze, cough, or visible cyanosis.    SKIN: Visible skin clear. No significant rash, abnormal pigmentation or lesions.  NEURO: Cranial nerves grossly intact.  Mentation and speech appropriate for age.  PSYCH: Appropriate affect, tone, and pace of words      Video-Visit Details    Type of service:  Video Visit   Originating Location (pt. Location): Home    Distant Location (provider location):  On-site  Platform used for Video Visit: Miri  Signed Electronically by: Igor Alegre DO

## 2024-09-23 ENCOUNTER — OFFICE VISIT (OUTPATIENT)
Dept: FAMILY MEDICINE | Facility: CLINIC | Age: 29
End: 2024-09-23
Payer: COMMERCIAL

## 2024-09-23 VITALS
OXYGEN SATURATION: 98 % | RESPIRATION RATE: 20 BRPM | WEIGHT: 175 LBS | HEIGHT: 63 IN | TEMPERATURE: 97.3 F | HEART RATE: 79 BPM | SYSTOLIC BLOOD PRESSURE: 128 MMHG | DIASTOLIC BLOOD PRESSURE: 82 MMHG | BODY MASS INDEX: 31.01 KG/M2

## 2024-09-23 DIAGNOSIS — Z23 NEED FOR HEPATITIS A VACCINATION: ICD-10-CM

## 2024-09-23 DIAGNOSIS — E89.0 POSTABLATIVE HYPOTHYROIDISM: ICD-10-CM

## 2024-09-23 DIAGNOSIS — Z13.6 ENCOUNTER FOR LIPID SCREENING FOR CARDIOVASCULAR DISEASE: ICD-10-CM

## 2024-09-23 DIAGNOSIS — Z13.0 SCREENING, ANEMIA, DEFICIENCY, IRON: ICD-10-CM

## 2024-09-23 DIAGNOSIS — D50.9 MICROCYTIC HYPOCHROMIC ANEMIA: ICD-10-CM

## 2024-09-23 DIAGNOSIS — Z76.89 ESTABLISHING CARE WITH NEW DOCTOR, ENCOUNTER FOR: ICD-10-CM

## 2024-09-23 DIAGNOSIS — Z23 NEED FOR INFLUENZA VACCINATION: ICD-10-CM

## 2024-09-23 DIAGNOSIS — Z97.5 IUD (INTRAUTERINE DEVICE) IN PLACE: ICD-10-CM

## 2024-09-23 DIAGNOSIS — Z23 NEED FOR HEPATITIS B VACCINATION: ICD-10-CM

## 2024-09-23 DIAGNOSIS — Z87.42 HISTORY OF ABNORMAL CERVICAL PAP SMEAR: ICD-10-CM

## 2024-09-23 DIAGNOSIS — Z01.84 IMMUNITY STATUS TESTING: ICD-10-CM

## 2024-09-23 DIAGNOSIS — Z00.00 HEALTH CARE MAINTENANCE: ICD-10-CM

## 2024-09-23 DIAGNOSIS — Z71.89 ADVANCED DIRECTIVES, COUNSELING/DISCUSSION: ICD-10-CM

## 2024-09-23 DIAGNOSIS — Z13.220 ENCOUNTER FOR LIPID SCREENING FOR CARDIOVASCULAR DISEASE: ICD-10-CM

## 2024-09-23 DIAGNOSIS — Z13.1 SCREENING FOR DIABETES MELLITUS: ICD-10-CM

## 2024-09-23 DIAGNOSIS — Z12.4 CERVICAL CANCER SCREENING: ICD-10-CM

## 2024-09-23 DIAGNOSIS — Z23 NEED FOR COVID-19 VACCINE: ICD-10-CM

## 2024-09-23 DIAGNOSIS — R79.89 LOW SERUM CALCIUM: ICD-10-CM

## 2024-09-23 DIAGNOSIS — Z00.00 ROUTINE GENERAL MEDICAL EXAMINATION AT A HEALTH CARE FACILITY: Primary | ICD-10-CM

## 2024-09-23 PROBLEM — R87.612 LOW GRADE SQUAMOUS INTRAEPITH LESION ON CYTOLOGIC SMEAR CERVIX (LGSIL): Status: RESOLVED | Noted: 2020-03-17 | Resolved: 2024-09-23

## 2024-09-23 LAB
ALBUMIN SERPL BCG-MCNC: 4.1 G/DL (ref 3.5–5.2)
ALP SERPL-CCNC: 80 U/L (ref 40–150)
ALT SERPL W P-5'-P-CCNC: 14 U/L (ref 0–50)
ANION GAP SERPL CALCULATED.3IONS-SCNC: 10 MMOL/L (ref 7–15)
AST SERPL W P-5'-P-CCNC: 19 U/L (ref 0–45)
BASOPHILS # BLD AUTO: 0 10E3/UL (ref 0–0.2)
BASOPHILS NFR BLD AUTO: 1 %
BILIRUB SERPL-MCNC: 0.5 MG/DL
BUN SERPL-MCNC: 8.7 MG/DL (ref 6–20)
CALCIUM SERPL-MCNC: 8.6 MG/DL (ref 8.8–10.4)
CHLORIDE SERPL-SCNC: 104 MMOL/L (ref 98–107)
CHOLEST SERPL-MCNC: 163 MG/DL
CREAT SERPL-MCNC: 0.7 MG/DL (ref 0.51–0.95)
EGFRCR SERPLBLD CKD-EPI 2021: >90 ML/MIN/1.73M2
EOSINOPHIL # BLD AUTO: 0.4 10E3/UL (ref 0–0.7)
EOSINOPHIL NFR BLD AUTO: 5 %
ERYTHROCYTE [DISTWIDTH] IN BLOOD BY AUTOMATED COUNT: 16.8 % (ref 10–15)
EST. AVERAGE GLUCOSE BLD GHB EST-MCNC: 103 MG/DL
FASTING STATUS PATIENT QL REPORTED: ABNORMAL
FASTING STATUS PATIENT QL REPORTED: ABNORMAL
GLUCOSE SERPL-MCNC: 94 MG/DL (ref 70–99)
HAV AB SER QL IA: NONREACTIVE
HBA1C MFR BLD: 5.2 % (ref 0–5.6)
HBV SURFACE AB SERPL IA-ACNC: <3.5 M[IU]/ML
HBV SURFACE AB SERPL IA-ACNC: NONREACTIVE M[IU]/ML
HBV SURFACE AG SERPL QL IA: NONREACTIVE
HCO3 SERPL-SCNC: 22 MMOL/L (ref 22–29)
HCT VFR BLD AUTO: 37.4 % (ref 35–47)
HDLC SERPL-MCNC: 58 MG/DL
HGB BLD-MCNC: 11.5 G/DL (ref 11.7–15.7)
HPV HR 12 DNA CVX QL NAA+PROBE: NEGATIVE
HPV16 DNA CVX QL NAA+PROBE: NEGATIVE
HPV18 DNA CVX QL NAA+PROBE: NEGATIVE
HUMAN PAPILLOMA VIRUS FINAL DIAGNOSIS: NORMAL
IMM GRANULOCYTES # BLD: 0 10E3/UL
IMM GRANULOCYTES NFR BLD: 0 %
LDLC SERPL CALC-MCNC: 57 MG/DL
LYMPHOCYTES # BLD AUTO: 1.2 10E3/UL (ref 0.8–5.3)
LYMPHOCYTES NFR BLD AUTO: 15 %
MCH RBC QN AUTO: 25.7 PG (ref 26.5–33)
MCHC RBC AUTO-ENTMCNC: 30.7 G/DL (ref 31.5–36.5)
MCV RBC AUTO: 84 FL (ref 78–100)
MONOCYTES # BLD AUTO: 0.7 10E3/UL (ref 0–1.3)
MONOCYTES NFR BLD AUTO: 8 %
NEUTROPHILS # BLD AUTO: 5.9 10E3/UL (ref 1.6–8.3)
NEUTROPHILS NFR BLD AUTO: 72 %
NONHDLC SERPL-MCNC: 105 MG/DL
PLATELET # BLD AUTO: 312 10E3/UL (ref 150–450)
POTASSIUM SERPL-SCNC: 3.8 MMOL/L (ref 3.4–5.3)
PROT SERPL-MCNC: 7.6 G/DL (ref 6.4–8.3)
RBC # BLD AUTO: 4.47 10E6/UL (ref 3.8–5.2)
SODIUM SERPL-SCNC: 136 MMOL/L (ref 135–145)
T4 FREE SERPL-MCNC: 1.28 NG/DL (ref 0.9–1.7)
TRIGL SERPL-MCNC: 240 MG/DL
TSH SERPL DL<=0.005 MIU/L-ACNC: 6.07 UIU/ML (ref 0.3–4.2)
WBC # BLD AUTO: 8.2 10E3/UL (ref 4–11)

## 2024-09-23 PROCEDURE — 80053 COMPREHEN METABOLIC PANEL: CPT | Performed by: FAMILY MEDICINE

## 2024-09-23 PROCEDURE — 87340 HEPATITIS B SURFACE AG IA: CPT | Performed by: FAMILY MEDICINE

## 2024-09-23 PROCEDURE — 86706 HEP B SURFACE ANTIBODY: CPT | Performed by: FAMILY MEDICINE

## 2024-09-23 PROCEDURE — 36415 COLL VENOUS BLD VENIPUNCTURE: CPT | Performed by: FAMILY MEDICINE

## 2024-09-23 PROCEDURE — 90480 ADMN SARSCOV2 VAC 1/ONLY CMP: CPT | Performed by: FAMILY MEDICINE

## 2024-09-23 PROCEDURE — 83036 HEMOGLOBIN GLYCOSYLATED A1C: CPT | Performed by: FAMILY MEDICINE

## 2024-09-23 PROCEDURE — 90471 IMMUNIZATION ADMIN: CPT | Performed by: FAMILY MEDICINE

## 2024-09-23 PROCEDURE — 90656 IIV3 VACC NO PRSV 0.5 ML IM: CPT | Performed by: FAMILY MEDICINE

## 2024-09-23 PROCEDURE — 99214 OFFICE O/P EST MOD 30 MIN: CPT | Mod: 25 | Performed by: FAMILY MEDICINE

## 2024-09-23 PROCEDURE — 84443 ASSAY THYROID STIM HORMONE: CPT | Performed by: FAMILY MEDICINE

## 2024-09-23 PROCEDURE — G0145 SCR C/V CYTO,THINLAYER,RESCR: HCPCS | Performed by: FAMILY MEDICINE

## 2024-09-23 PROCEDURE — 80061 LIPID PANEL: CPT | Performed by: FAMILY MEDICINE

## 2024-09-23 PROCEDURE — 99395 PREV VISIT EST AGE 18-39: CPT | Mod: 25 | Performed by: FAMILY MEDICINE

## 2024-09-23 PROCEDURE — 85025 COMPLETE CBC W/AUTO DIFF WBC: CPT | Performed by: FAMILY MEDICINE

## 2024-09-23 PROCEDURE — 86708 HEPATITIS A ANTIBODY: CPT | Performed by: FAMILY MEDICINE

## 2024-09-23 PROCEDURE — 84439 ASSAY OF FREE THYROXINE: CPT | Performed by: FAMILY MEDICINE

## 2024-09-23 PROCEDURE — 91320 SARSCV2 VAC 30MCG TRS-SUC IM: CPT | Performed by: FAMILY MEDICINE

## 2024-09-23 PROCEDURE — 87624 HPV HI-RISK TYP POOLED RSLT: CPT | Performed by: FAMILY MEDICINE

## 2024-09-23 RX ORDER — LEVOTHYROXINE SODIUM 137 UG/1
137 TABLET ORAL DAILY
Qty: 45 TABLET | Refills: 0 | Status: SHIPPED | OUTPATIENT
Start: 2024-09-23

## 2024-09-23 ASSESSMENT — PAIN SCALES - GENERAL: PAINLEVEL: NO PAIN (0)

## 2024-09-23 NOTE — PROGRESS NOTES
The below note was dictated using voice recognition. Although reviewed after completion, some word and grammatical error may remain .       Preventive Care Visit  [facility]  Genna Amato MD, Family Medicine  Sep 23, 2024      Assessment & Plan     Routine general medical examination at a health care facility  Establish care   Seen today to establish care in a physical,   Health care maintenance reviewed  Encouraged working on health care directives and given honoring choices to review    No breast issues, exam is normal. Encouraged self check regularly & screening mammogram starting age 38. Has no FH of breast, ovarian or colon cancer    PAP HPV due, prior pap in 2020 while preg showed LSIL neg HPV, was advised colposcopy but feels did not get done. Will do pap today.   No std testing ended, monogamous relationship  Lmp 9/7/24  Cycles regular feels normal  Reports has a Paraguard IUD placed 2022, checks strings sporadically    Vaccines reviewed  Given the  flu shot & COVID vaccine today   Thinks had hep B and A as a child, no records, will check titers to assess immunity  After the visit labs showed no immunity against hepatitis A or B, this could be due to waning immunity from prior vaccination on may have never  been vaccinated in the past against these viruses. Would recommend getting Twinrix which is a combination hep A and B vaccine, a series of 3 shots at 0, 2 and 4-month intervals to provide lifelong immunity against these preventable viral illnesses.  She may schedule this with the nurse at  her convenience. Just make sure she mentions it needs to be the Twinrix combination vaccine    Will do non fasting labs today and make further recommendations when reviewed.    Hx of Graves dx in 2012 S/p radioactive iodine ablation in 2013 with post ablative hypothyroidism, currently on levothyroxine 125 mcg daily. Not had TSH checked since 2022. Notes some wt gain. Used to see endo in past  but not in a while. Ok to labs and endo referral   TSH came back slightly above range in the 6's and the free T4 is normal.  Recommend changing medication dose to 137 micrograms/day (a prescription has been sent to her pharmacy) and rechecking TSH with a lab appointment in 6 weeks. Further recommendations will be made at that time.  Would need to check TSH serially until get two in a row that are within range before saying that is the dose that she needs to continue on    BMI 31, doing cardio via thread mill daily. Discussed healthy diet and will do labs today. Can refer to wt clinic if needed in future.   Glucose is normal. HBaA1C is normal. Cholesterol levels (LDL,HDL,)are normal.  Triglycerides are likely elevated due to being a non fasting sample can do fasting check in 1 year. Liver and gallbladder tests (ALT,AST, Alk phos,bilirubin) are normal. Kidney function (GFR), sodium & potassium normal.     After the visit labs showed Hemoglobin is  mildly decreased at 11.5  indicating new dx of mild anemia.  White blood cell and platelet counts are normal. Concentration of red blood cell is also low.  Hb & indices suggestive of iron deficiency anemia. Most common causes like losses incurred due to ongoing menstrual losses and prior childbirth x 3. Last checked 4 years ago was normal. Recommend increasing iron in diet, maybe take a multivitamin with iron. Recommend rechecking hemoglobin along with iron and ferritin, B 12 & peripheral smear to further evaluate anemia in a couple of months in a lab only appointment. I will put these orders in place & will go from there    Calcium came back slightly decreased. Advised increasing calcium in diet and rechecking this in  6 weeks with vit d & pth & ionized calcium to get a more accurate picture    Return in 1 yr for a physical but sooner for labs as noted above  - INFLUENZA VACCINE,SPLIT VIRUS,TRIVALENT,PF(FLUZONE)  - COVID-19 12+ (PFIZER)  - PRIMARY CARE FOLLOW-UP SCHEDULING;  Future  - CBC with Platelets & Differential; Future  - Comprehensive metabolic panel; Future  - Lipid panel reflex to direct LDL Non-fasting; Future  - Hemoglobin A1c; Future  - Hepatitis A Antibody Total; Future  - Hepatitis B Surface Antibody; Future  - Hepatitis B surface antigen; Future  - CBC with Platelets & Differential  - Comprehensive metabolic panel  - Lipid panel reflex to direct LDL Non-fasting  - Hemoglobin A1c  - Hepatitis A Antibody Total  - Hepatitis B Surface Antibody  - Hepatitis B surface antigen    Cervical cancer screening  History of abnormal cervical Pap smear  PAP HPV due, prior pap in 2020 while preg showed LSIL neg HPV, was advised colposcopy but feels did not get done. Will do pap today.   - HPV and Gynecologic Cytology Panel - Recommended Age 30 - 65 Years  - Gynecologic Cytology (PAP)    IUD (intrauterine device) in place  Lmp 9/7/24. Cycles regular feels normal, on exam white strings at Os confirm Paraguard IUD, noted placed 2022,     Postablative hypothyroidism  Hx of Graves dx in 2012 S/p radioactive iodine ablation in 2013 with post ablative hypothyroidism, currently on levothyroxine 125 mcg daily. Not had TSH checked since 2022. Notes some wt gain. Used to see endo in past but not in a while. Ok to labs and endo referral   TSH came back slightly above range in the 6's and the free T4 is normal.  Recommend changing medication dose to 137 micrograms/day (a prescription has been sent to her pharmacy) and rechecking TSH with a lab appointment in 6 weeks. Further recommendations will be made at that time.  Would need to check TSH serially until get two in a row that are within range before saying that is the dose that she needs to continue on  - TSH with free T4 reflex; Future  - Adult Endocrinology  Referral; Future  - TSH with free T4 reflex  - T4 free  - TSH with free T4 reflex; Future  - levothyroxine (SYNTHROID/LEVOTHROID) 137 MCG tablet; Take 1 tablet (137 mcg) by mouth  daily. TAKE 1 TABLET (125 MCG TOTAL) BY MOUTH DAILY.    BMI 31.0-31.9,adult  BMI 31, doing cardio via thread mill daily. Discussed healthy diet and will do labs today. Can refer to wt clinic if needed in future.   Glucose is normal. HBaA1C is normal. Cholesterol levels (LDL,HDL,)are normal.  Triglycerides are likely elevated due to being a non fasting sample can do fasting check in 1 year. Liver and gallbladder tests (ALT,AST, Alk phos,bilirubin) are normal. Kidney function (GFR), sodium & potassium normal.   - Lipid panel reflex to direct LDL Non-fasting; Future  - Lipid panel reflex to direct LDL Non-fasting    Health care maintenance  Health care maintenance reviewed  Encouraged working on health care directives and given honoring choices to review  No breast issues, exam is normal. Encouraged self check regularly & screening mammogram starting age 40. Has no FH of breast, ovarian or colon cancer  PAP HPV due, prior pap in 2020 while preg showed LSIL neg HPV, was advised colposcopy but feels did not get done. Will do pap today.   No std testing ended, monogamous relationship  Lmp 9/7/24  Cycles regular feels normal  Reports has a Paraguard IUD placed 2022, checks strings sporadically  Vaccines reviewed  Given the  flu shot & COVID vaccine today   Thinks had hep B and A as a child, no records, will check titers to assess immunity  After the visit labs showed no immunity against hepatitis A or B, this could be due to waning immunity from prior vaccination on may have never  been vaccinated in the past against these viruses. Would recommend getting Twinrix which is a combination hep A and B vaccine, a series of 3 shots at 0, 2 and 4-month intervals to provide lifelong immunity against these preventable viral illnesses.  She may schedule this with the nurse at  her convenience. Just make sure she mentions it needs to be the Twinrix combination vaccine  Will do non fasting labs today and make further recommendations  when reviewed.  Return in 1 yr for a physical but sooner for labs as noted above  - REVIEW OF HEALTH MAINTENANCE PROTOCOL ORDERS    Advanced directives, counseling/discussion    Need for influenza vaccination  - INFLUENZA VACCINE,SPLIT VIRUS,TRIVALENT,PF(FLUZONE)    Need for COVID-19 vaccine  - COVID-19 12+ (PFIZER)    Need for hepatitis B vaccination  Need for hepatitis A vaccination  Immunity status testing  Tests indicate no immunity against hepatitis A or B, this could be due to waning immunity from prior vaccination on may have never  been vaccinated in the past against these viruses. Would recommend getting Twinrix which is a combination hep A and B vaccine, a series of 3 shots at 0, 2 and 4-month intervals to provide lifelong immunity against these preventable viral illnesses.  She may schedule this with the nurse at  her convenience. Just make sure she mentions it needs to be the Twinrix combination vaccine  - Hepatitis A Antibody Total; Future  - Hepatitis B Surface Antibody; Future  - Hepatitis B surface antigen; Future  - Hepatitis A Antibody Total  - Hepatitis B Surface Antibody  - Hepatitis B surface antigen    Screening for diabetes mellitus  - Comprehensive metabolic panel; Future  - Hemoglobin A1c; Future  - Comprehensive metabolic panel  - Hemoglobin A1c    Encounter for lipid screening for cardiovascular disease  - Lipid panel reflex to direct LDL Non-fasting; Future  - Lipid panel reflex to direct LDL Non-fasting    Screening, anemia, deficiency, iron  - CBC with Platelets & Differential; Future  - CBC with Platelets & Differential    Microcytic hypochromic anemia  After the visit labs showed Hemoglobin is  mildly decreased at 11.5  indicating new dx of mild anemia.  White blood cell and platelet counts are normal. Concentration of red blood cell is also low.  Hb & indices suggestive of iron deficiency anemia. Most common causes like losses incurred due to ongoing menstrual losses and prior  "childbirth x 3. Last checked 4 years ago was normal. Recommend increasing iron in diet, maybe take a multivitamin with iron. Recommend rechecking hemoglobin along with iron and ferritin, B 12 & peripheral smear to further evaluate anemia in a couple of months in a lab only appointment. I will put these orders in place & will go from there  - Lab Blood Morphology Pathologist Review; Future  - Iron and iron binding capacity; Future  - Vitamin B 12; Future  - Folate; Future    Low serum calcium  Calcium came back slightly decreased. Advised increasing calcium in diet and rechecking this in  6 weeks with vit d & pth & ionized calcium to get a more accurate picture  - Basic metabolic panel  (Ca, Cl, CO2, Creat, Gluc, K, Na, BUN); Future  - Ionized Calcium; Future  - Vitamin D Deficiency; Future  - Parathyroid Hormone Intact; Future    Patient has been advised of split billing requirements and indicates understanding: Yes    BMI  Estimated body mass index is 31.4 kg/m  as calculated from the following:    Height as of this encounter: 1.59 m (5' 2.6\").    Weight as of this encounter: 79.4 kg (175 lb).   Weight management plan: Discussed healthy diet and exercise guidelines    Counseling  Appropriate preventive services were addressed with this patient via screening, questionnaire, or discussion as appropriate for fall prevention, nutrition, physical activity, Tobacco-use cessation, social engagement, weight loss and cognition.  Checklist reviewing preventive services available has been given to the patient.  Reviewed patient's diet, addressing concerns and/or questions.   The patient was instructed to see the dentist every 6 months.   She is at risk for psychosocial distress and has been provided with information to reduce risk.   Work on weight loss  Regular exercise  See Patient Instructions        Beau Cr is a 29 year old, presenting for the following:  Physical        9/23/2024    11:17 AM   Additional " Questions   Roomed by Kira VALLES   Accompanied by self         9/23/2024    11:17 AM   Patient Reported Additional Medications   Patient reports taking the following new medications None        Health Care Directive  Patient does not have a Health Care Directive or Living Will: Discussed advance care planning with patient; information given to patient to review.    History of Present Illness       Hypothyroidism:     Since last visit, patient describes the following symptoms::  Hair loss and Weight gain    Weight gain::  6-10 lbs.She exercises with enough effort to increase her heart rate 20 to 29 minutes per day.  She exercises with enough effort to increase her heart rate 4 days per week.   She is taking medications regularly.        9/23/2024   General Health   How would you rate your overall physical health? Good    Good   Feel stress (tense, anxious, or unable to sleep) Only a little    Only a little       Multiple values from one day are sorted in reverse-chronological order   (!) STRESS CONCERN      9/23/2024   Nutrition   Three or more servings of calcium each day? Yes    Yes   Diet: Regular (no restrictions)    Regular (no restrictions)   How many servings of fruit and vegetables per day? (!) 2-3    (!) 2-3   How many sweetened beverages each day? 0-1    0-1       Multiple values from one day are sorted in reverse-chronological order         9/23/2024   Exercise   Days per week of moderate/strenuous exercise 4 days    4 days   Average minutes spent exercising at this level 30 min    30 min       Multiple values from one day are sorted in reverse-chronological order         9/23/2024   Social Factors   Frequency of gathering with friends or relatives Once a week    Once a week   Worry food won't last until get money to buy more No    No   Food not last or not have enough money for food? No    No   Do you have housing? (Housing is defined as stable permanent housing and does not include staying outside in a  car, in a tent, in an abandoned building, in an overnight shelter, or couch-surfing.) Yes    Yes   Are you worried about losing your housing? No    No   Lack of transportation? No    No   Unable to get utilities (heat,electricity)? No    No       Multiple values from one day are sorted in reverse-chronological order         9/23/2024   Dental   Dentist two times every year? (!) NO    (!) NO       Multiple values from one day are sorted in reverse-chronological order         9/23/2024   TB Screening   Were you born outside of the [place]? No          Today's PHQ-2 Score:       7/11/2024    12:09 PM   PHQ-2 ( 1999 [employer])   Q1: Little interest or pleasure in doing things 0   Q2: Feeling down, depressed or hopeless 0   PHQ-2 Score 0   Q1: Little interest or pleasure in doing things Not at all   Q2: Feeling down, depressed or hopeless Not at all   PHQ-2 Score 0         9/23/2024   Substance Use   Alcohol more than 3/day or more than 7/wk No    Not Applicable   Do you use any other substances recreationally? No    No       Multiple values from one day are sorted in reverse-chronological order     Social History     Tobacco Use    Smoking status: Never    Smokeless tobacco: Never   Vaping Use    Vaping status: Never Used   Substance Use Topics    Alcohol use: Not Currently    Drug use: Never          Mammogram Screening - Patient under 40 years of age: Routine Mammogram Screening not recommended.         9/23/2024   STI Screening   New sexual partner(S) since last STI/HIV test? No    No       Multiple values from one day are sorted in reverse-chronological order     History of abnormal Pap smear: No - age 30- 64 PAP with HPV every 5 years recommended        Latest Ref Rng & Units 3/9/2020     5:17 PM   PAP / HPV   PAP Negative for squamous intraepithelial lesion or malignancy. LSIL encompassing HPV/mild dysplasia/CIN1  Electronically signed by Syed Camargo MD on 3/12/2020 at 11:47 AM      HPV 16 DNA NEG Negative    HPV  18 DNA NEG Negative    Other HR HPV NEG Negative          2024   Contraception/Family Planning   Questions about contraception or family planning No    No       Multiple values from one day are sorted in reverse-chronological order        Reviewed and updated as needed this visit by Provider    Allergies  Meds  Problems  Med Hx  Surg Hx  Fam Hx            Past Medical History:   Diagnosis Date    Hx of Graves' disease     Low TSH and nuclear updtake scan 3/15/12 c/w Graves.  Received 13 mCi of I-131 ablation on 2013. On synthroid since that time.    Low grade squamous intraepith lesion on cytologic smear cervix (lgsil) 2020    LSIL on pap with HPV negative 3/2020 currently pregnant, needs colposcopy   6 wks pp  Formatting of this note might be different from the original.   LSIL on pap with HPV negative 3/2020 currently pregnant, needs colposcopy 6 wks pp       History reviewed. No pertinent surgical history.  OB History    Para Term  AB Living   3 3 3 0 0 3   SAB IAB Ectopic Multiple Live Births   0 0 0 0 3      # Outcome Date GA Lbr John/2nd Weight Sex Type Anes PTL Lv   3 Term 20 39w4d 11:53 / 00:37 2.87 kg (6 lb 5.2 oz) M Vag-Spont EPI N EVELINE      Name: SAMIA ALEXANDER-JUSTICE      Apgar1: 7  Apgar5: 9   2 Term 13 39w1d    Vag-Spont EPI N EVELINE      Complications: Fever   1 Term 11     Vag-Spont EPI  EVELINE     Lab work is in process  Labs reviewed in EPIC  BP Readings from Last 3 Encounters:   24 128/82   22 110/78   06/10/21 118/80    Wt Readings from Last 3 Encounters:   24 79.4 kg (175 lb)   22 74.4 kg (164 lb)   06/10/21 82.2 kg (181 lb 3.2 oz)         Patient Active Problem List   Diagnosis    Postablative hypothyroidism    History of abnormal cervical Pap smear     History reviewed. No pertinent surgical history.    Social History     Tobacco Use    Smoking status: Never    Smokeless tobacco: Never   Substance Use Topics    Alcohol use:  Not Currently     Family History   Problem Relation Age of Onset    Diabetes Mother     Cerebrovascular Disease Father     Thyroid Disease Sister          Current Outpatient Medications   Medication Sig Dispense Refill    levothyroxine (SYNTHROID/LEVOTHROID) 125 MCG tablet TAKE 1 TABLET (125 MCG TOTAL) BY MOUTH DAILY. 90 tablet 0     No Known Allergies  Recent Labs   Lab Test 09/29/22  1111 07/16/20  1420   TSH 1.17 0.77      BACKGROUND  29 yr P 3003, B pos, with hx of  Graves dx with a low TSH in 3/2012 S/p  12 mG of I-131 ablation nov 2013 with post ablation hypothyroidism on levothyroxine , hx of LSIL with neg HPV on pap 3/2020 when pregnant and advised a colposcopy in 6 weeks at the time never done, reported copper IUD in place since 2022, BMI 31, microcytic hypochromic anemia, low serum calcium discovered on lab after visit in 2024,   previously doctoring at [facility] then lost to follow up . Last TSH in 2022, meds refilled without labs being done. Seen virtually by a provider 7/11/24 & advised inperson  physical and labs and given 90 days of 125 mcg med until labs could be reviewed. Labs not done. Mn  negative.     CURRENTLY   Here to establish care in a physical, used to see endo but they retired and then later PCP also retired  Health care maintenance reviewed  No ACP on file and given honoring choices to review    No breast issues, Ok to examine  No FH of breast, ovarian or colon cancer  Lmp 9/7/24  Cycles regular feels normal  Reports has a Paraguard IUD placed 2022, checks strings sporadically    PAP HPV due, prior pap in 2020 while preg showed LSIL neg HPV, was advised colposcopy but feels did not get done. Will do pap today.   No std testing ended, monogamous relationship    Vaccines reviewed  Will do flu shot & COVID vaccine today   Thinks had hep B and A as a child, no records, ok to check titers    Will do non fasting labs today and make further recommendations when reviewed.    Hx of graves S/p  "ablation with post ablative hypothyroidism on levothyroxine 125 mcg daily. Not had TSH checked since 2022. Notes some wt gain. Used to see endo in past not in a while. Ok to labs and endo referral   Currently reports no fever or chills, no headache or dizziness, no double or blurry vision, no facial pain, earache, sore throat, runny nose, post nasal drip, no trouble hearing, smelling, tasting or swallowing, no cough, no chest pain, trouble breathing or palpitations, No abdominal pain, heart burn, reflux, nausea or vomiting or diarrhea or constipation, no blood in stools or black stools, no weight loss or night sweats. No dysuria, hematuria, frequency, urgency, hesitancy, incontinence, No pelvic complaints. No leg swelling or joint pain. No rash.    BMI 31 doing cardio via thread mill daily. Discussed healthy diet and will do labs today. Can refer to wt clinic if needed in future.     Review of Systems  Constitutional, HEENT, cardiovascular, pulmonary, GI, , musculoskeletal, neuro, skin, endocrine and psych systems are negative, except as otherwise noted.     Objective    Exam  /82 (BP Location: Right arm, Patient Position: Sitting, Cuff Size: Adult Regular)   Pulse 79   Temp 97.3  F (36.3  C) (Temporal)   Resp 20   Ht 1.59 m (5' 2.6\")   Wt 79.4 kg (175 lb)   LMP 08/07/2024 (Approximate)   SpO2 98%   BMI 31.40 kg/m     Estimated body mass index is 31.4 kg/m  as calculated from the following:    Height as of this encounter: 1.59 m (5' 2.6\").    Weight as of this encounter: 79.4 kg (175 lb).    Physical Exam  GENERAL: alert and no distress  EYES: Eyes grossly normal to inspection, PERRL and conjunctivae and sclerae normal  HENT: ear canals and TM's normal, nose and mouth without ulcers or lesions  NECK: no adenopathy, no asymmetry, masses, or scars  RESP: lungs clear to auscultation - no rales, rhonchi or wheezes  BREAST: normal without masses, tenderness or nipple discharge and no palpable axillary " masses or adenopathy  CV: regular rate and rhythm, normal S1 S2, no S3 or S4, no murmur, click or rub, no peripheral edema  ABDOMEN: soft, non tender, no hepatosplenomegaly, no masses and bowel sounds normal   (female): normal female external genitalia, normal urethral meatus, normal vaginal mucosa, normal cervix, adnexa, and uterus without masses, and white strings of Paraguard noted at os, PAP / HPV obtained, sample bottle labelled with signed patient sticker with correct date of birth and name confirmed  MS: no gross musculoskeletal defects noted, no edema  SKIN: no suspicious lesions or rashes  NEURO: Normal strength and tone, mentation intact and speech normal  PSYCH: mentation appears normal, affect normal/bright  LYMPH: no cervical, supraclavicular, axillary, or inguinal adenopathy    Signed Electronically by: Genna Amato MD

## 2024-09-23 NOTE — RESULT ENCOUNTER NOTE
Hello -    Here are my comments about your recent results:    -A1C (diabetic test) is normal and indicates that your blood sugar has been in a normal range the last 3 months.    -Hemoglobin is  mildly decreased at 11.5  indicating mild anemia.  -White blood cell and platelet counts are normal.  Concentration of red blood cell is also low.  Mild anemia and this picture suggestive of iron deficiency anemia.  Most common causes losses to menstrual nation and childbirth.  Last checked 4 years ago was normal.  Recommend increasing iron in diet may take a multivitamin with iron.  Recommend rechecking hemoglobin along with iron and ferritin B12 peripheral smear to further evaluate anemia in a couple of months and a lab only appointment I will put these orders in place    For additional lab test information, labtestsonline.org is an excellent reference..    Please let us know if you have any questions or concerns.     Regards,  Genna Amato MD

## 2024-09-23 NOTE — RESULT ENCOUNTER NOTE
Hello -    Here are my comments about your recent results:    -Hemoglobin is  mildly decreased at 11.5  indicating mild anemia.  -White blood cell and platelet counts are normal.  Concentration of red blood cell is also low.  Mild anemia and this picture suggestive of iron deficiency anemia.  Most common causes losses to menstrual nation and childbirth.  Last checked 4 years ago was normal.  Recommend increasing iron in diet may take a multivitamin with iron.  Recommend rechecking hemoglobin along with iron and ferritin B12 peripheral smear to further evaluate anemia in a couple of months and a lab only appointment I will put these orders in place  For additional lab test information, labtestsonline.org is an excellent reference..    Please let us know if you have any questions or concerns.     Regards,  Genna Amato MD

## 2024-09-23 NOTE — PATIENT INSTRUCTIONS
Patient Education   Preventive Care Advice   This is general advice given by our system to help you stay healthy. However, your care team may have specific advice just for you. Please talk to your care team about your preventive care needs.  Nutrition  Eat 5 or more servings of fruits and vegetables each day.  Try wheat bread, brown rice and whole grain pasta (instead of white bread, rice, and pasta).  Get enough calcium and vitamin D. Check the label on foods and aim for 100% of the RDA (recommended daily allowance).  Lifestyle  Exercise at least 150 minutes each week  (30 minutes a day, 5 days a week).  Do muscle strengthening activities 2 days a week. These help control your weight and prevent disease.  No smoking.  Wear sunscreen to prevent skin cancer.  Have a dental exam and cleaning every 6 months.  Yearly exams  See your health care team every year to talk about:  Any changes in your health.  Any medicines your care team has prescribed.  Preventive care, family planning, and ways to prevent chronic diseases.  Shots (vaccines)   HPV shots (up to age 26), if you've never had them before.  Hepatitis B shots (up to age 59), if you've never had them before.  COVID-19 shot: Get this shot when it's due.  Flu shot: Get a flu shot every year.  Tetanus shot: Get a tetanus shot every 10 years.  Pneumococcal, hepatitis A, and RSV shots: Ask your care team if you need these based on your risk.  Shingles shot (for age 50 and up)  General health tests  Diabetes screening:  Starting at age 35, Get screened for diabetes at least every 3 years.  If you are younger than age 35, ask your care team if you should be screened for diabetes.  Cholesterol test: At age 39, start having a cholesterol test every 5 years, or more often if advised.  Bone density scan (DEXA): At age 50, ask your care team if you should have this scan for osteoporosis (brittle bones).  Hepatitis C: Get tested at least once in your life.  STIs (sexually  transmitted infections)  Before age 24: Ask your care team if you should be screened for STIs.  After age 24: Get screened for STIs if you're at risk. You are at risk for STIs (including HIV) if:  You are sexually active with more than one person.  You don't use condoms every time.  You or a partner was diagnosed with a sexually transmitted infection.  If you are at risk for HIV, ask about PrEP medicine to prevent HIV.  Get tested for HIV at least once in your life, whether you are at risk for HIV or not.  Cancer screening tests  Cervical cancer screening: If you have a cervix, begin getting regular cervical cancer screening tests starting at age 21.  Breast cancer scan (mammogram): If you've ever had breasts, begin having regular mammograms starting at age 40. This is a scan to check for breast cancer.  Colon cancer screening: It is important to start screening for colon cancer at age 45.  Have a colonoscopy test every 10 years (or more often if you're at risk) Or, ask your provider about stool tests like a FIT test every year or Cologuard test every 3 years.  To learn more about your testing options, visit:   .  For help making a decision, visit:   https://bit.ly/zn82536.  Prostate cancer screening test: If you have a prostate, ask your care team if a prostate cancer screening test (PSA) at age 55 is right for you.  Lung cancer screening: If you are a current or former smoker ages 50 to 80, ask your care team if ongoing lung cancer screenings are right for you.  For informational purposes only. Not to replace the advice of your health care provider. Copyright   2023 [employer]. All rights reserved. Clinically reviewed by the [employer] [facility]. [employer] [id number] - REV 01/24.

## 2024-09-24 NOTE — RESULT ENCOUNTER NOTE
Hello -    Here are my comments about your recent results:  -Cholesterol levels (LDL,HDL,)are normal.  Triglycerides are likely elevated due to being a nonfasting sample ADVISE: rechecking fasting in 1 year.   -Liver and gallbladder tests (ALT,AST, Alk phos,bilirubin) are normal.  -Kidney function (GFR) is normal.  -Sodium is normal.  -Potassium is normal.  -Calcium is decreased.  ADVISE: Increasing calcium in diet and rechecking this in  6 weeks  -Glucose (diabetic screening test) is normal.  -TSH (thyroid stimulating hormone) is abnormal suggesting you are currently underreplaced.  The TSH is slightly above range and the free T4 is normal.  Recommend changing your medication dose to 150 micrograms/day (a prescription has been sent to your pharmacy) and rechecking your TSH with a lab appointment in 6 weeks.  Further recommendations will be made at that time.  Would need to check TSH serially until get two in a row that are within range before saying that is the dose that you need to continue on.  Testing indicates that you have no immunity against hepatitis A or B this could be due to waning vaccination on ever having been vaccinated in the past against these viruses.  Would recommend getting Twinrix which is a combination hep A and B vaccine a series of 3 shots at 0, 2 and 4-month intervals which are then hopefully provide lifelong immunity against these preventable viral illnesses.  You may schedule this with the nurse at your convenience.  Just make sure you mention it is Twinrix combination vaccine  For additional lab test information, [email or web address] is an excellent reference..    Please let us know if you have any questions or concerns.     Regards,  Genna Amato MD

## 2024-09-24 NOTE — RESULT ENCOUNTER NOTE
Hello -    Here are my comments about your recent results:  Clarification I meant I will increase your levothyroxine from 125  to 137 mcg ( not 150 erroneously stated in prior message)   Rest of recommendations are the same    Please let us know if you have any questions or concerns.     Regards,  Genna Amato MD

## 2024-09-25 PROBLEM — Z97.5 IUD (INTRAUTERINE DEVICE) IN PLACE: Status: ACTIVE | Noted: 2024-09-25

## 2024-09-25 LAB
BKR AP ASSOCIATED HPV REPORT: NORMAL
BKR LAB AP GYN ADEQUACY: NORMAL
BKR LAB AP GYN INTERPRETATION: NORMAL
BKR LAB AP LMP: NORMAL
BKR LAB AP PREVIOUS ABNL DX: NORMAL
BKR LAB AP PREVIOUS ABNORMAL: NORMAL
PATH REPORT.COMMENTS IMP SPEC: NORMAL
PATH REPORT.COMMENTS IMP SPEC: NORMAL
PATH REPORT.RELEVANT HX SPEC: NORMAL

## 2024-11-03 DIAGNOSIS — E89.0 POSTABLATIVE HYPOTHYROIDISM: ICD-10-CM

## 2024-11-03 RX ORDER — LEVOTHYROXINE SODIUM 137 UG/1
137 TABLET ORAL DAILY
Qty: 30 TABLET | Refills: 0 | Status: SHIPPED | OUTPATIENT
Start: 2024-11-03

## 2024-11-04 NOTE — TELEPHONE ENCOUNTER
Left message on answering machine for patient to call clinic triage.   Please call triage to discuss this refill message.    DELMAR Davis      From 9/23/24 lab result-  TSH (thyroid stimulating hormone) is abnormal suggesting you are currently underreplaced.  The TSH is slightly above range and the free T4 is normal.  Recommend changing your medication dose to 150 micrograms/day (a prescription has been sent to your pharmacy) and rechecking your TSH with a lab appointment in 6 weeks.  Further recommendations will be made at that time.  Would need to check TSH serially until get two in a row that are within range before saying that is the dose that you need to continue on.

## 2024-11-06 NOTE — TELEPHONE ENCOUNTER
Writer called and left message on patient's voicemail to call back and speak with a triage nurse.    Vivian Knott, BSN RN  [facility]

## 2024-11-07 NOTE — TELEPHONE ENCOUNTER
Attempt #3. No answer. Left message on patient's voicemail to call back and speak with a triage nurse.     Sent [employer] message.     Madie Castillo RN  [facility]

## 2024-11-14 ENCOUNTER — MYC MEDICAL ADVICE (OUTPATIENT)
Dept: FAMILY MEDICINE | Facility: CLINIC | Age: 29
End: 2024-11-14

## 2024-12-16 ENCOUNTER — LAB (OUTPATIENT)
Dept: LAB | Facility: CLINIC | Age: 29
End: 2024-12-16
Payer: COMMERCIAL

## 2024-12-16 DIAGNOSIS — D50.9 MICROCYTIC HYPOCHROMIC ANEMIA: ICD-10-CM

## 2024-12-16 LAB
BASOPHILS # BLD AUTO: 0 10E3/UL (ref 0–0.2)
BASOPHILS NFR BLD AUTO: 0 %
EOSINOPHIL # BLD AUTO: 0.3 10E3/UL (ref 0–0.7)
EOSINOPHIL NFR BLD AUTO: 3 %
ERYTHROCYTE [DISTWIDTH] IN BLOOD BY AUTOMATED COUNT: 17.1 % (ref 10–15)
FOLATE SERPL-MCNC: 8.2 NG/ML (ref 4.6–34.8)
HCT VFR BLD AUTO: 39.7 % (ref 35–47)
HGB BLD-MCNC: 12.5 G/DL (ref 11.7–15.7)
IMM GRANULOCYTES # BLD: 0 10E3/UL
IMM GRANULOCYTES NFR BLD: 0 %
LYMPHOCYTES # BLD AUTO: 1.4 10E3/UL (ref 0.8–5.3)
LYMPHOCYTES NFR BLD AUTO: 14 %
MCH RBC QN AUTO: 27.5 PG (ref 26.5–33)
MCHC RBC AUTO-ENTMCNC: 31.5 G/DL (ref 31.5–36.5)
MCV RBC AUTO: 87 FL (ref 78–100)
MONOCYTES # BLD AUTO: 0.6 10E3/UL (ref 0–1.3)
MONOCYTES NFR BLD AUTO: 6 %
NEUTROPHILS # BLD AUTO: 7.6 10E3/UL (ref 1.6–8.3)
NEUTROPHILS NFR BLD AUTO: 76 %
PLATELET # BLD AUTO: 349 10E3/UL (ref 150–450)
RBC # BLD AUTO: 4.55 10E6/UL (ref 3.8–5.2)
RETICS # AUTO: 0.1 10E6/UL (ref 0.03–0.1)
RETICS/RBC NFR AUTO: 2.2 % (ref 0.5–2)
WBC # BLD AUTO: 10 10E3/UL (ref 4–11)

## 2024-12-16 PROCEDURE — 85025 COMPLETE CBC W/AUTO DIFF WBC: CPT

## 2024-12-16 PROCEDURE — 36415 COLL VENOUS BLD VENIPUNCTURE: CPT

## 2024-12-16 PROCEDURE — 83540 ASSAY OF IRON: CPT

## 2024-12-16 PROCEDURE — 85045 AUTOMATED RETICULOCYTE COUNT: CPT

## 2024-12-16 PROCEDURE — 82746 ASSAY OF FOLIC ACID SERUM: CPT

## 2024-12-16 PROCEDURE — 82607 VITAMIN B-12: CPT

## 2024-12-16 PROCEDURE — 99207 BLOOD MORPHOLOGY PATHOLOGIST REVIEW: CPT | Performed by: PATHOLOGY

## 2024-12-16 PROCEDURE — 83550 IRON BINDING TEST: CPT

## 2024-12-17 ENCOUNTER — TELEPHONE (OUTPATIENT)
Dept: FAMILY MEDICINE | Facility: CLINIC | Age: 29
End: 2024-12-17
Payer: COMMERCIAL

## 2024-12-17 DIAGNOSIS — R79.89 LOW SERUM CALCIUM: ICD-10-CM

## 2024-12-17 DIAGNOSIS — R79.89 ELEVATED PTHRP LEVEL: ICD-10-CM

## 2024-12-17 DIAGNOSIS — E89.0 POSTABLATIVE HYPOTHYROIDISM: ICD-10-CM

## 2024-12-17 DIAGNOSIS — E55.9 VITAMIN D DEFICIENCY: Primary | ICD-10-CM

## 2024-12-17 LAB
IRON BINDING CAPACITY (ROCHE): 330 UG/DL (ref 240–430)
IRON SATN MFR SERPL: 35 % (ref 15–46)
IRON SERPL-MCNC: 117 UG/DL (ref 37–145)
VIT B12 SERPL-MCNC: 288 PG/ML (ref 232–1245)

## 2024-12-17 RX ORDER — LEVOTHYROXINE SODIUM 137 UG/1
137 TABLET ORAL DAILY
Qty: 100 TABLET | Refills: 0 | Status: SHIPPED | OUTPATIENT
Start: 2024-12-17 | End: 2025-03-27

## 2024-12-17 NOTE — RESULT ENCOUNTER NOTE
Hello -    Here are my comments about your recent results:  Mildly elevated reticulocyte count.  Suggest a normal functioning bone marrow and with recent resolved anemia likely due to repletion of deficient iron & recovery from losses due to menstrual bleeding.  In the absence of current anemia hemolysis seems to be less likely a cause of prior anemia.  Will see what rest of labs show    Please let us know if you have any questions or concerns.     Regards,  Genna Amato MD

## 2024-12-17 NOTE — TELEPHONE ENCOUNTER
Yes noted thanks this note was from yesterday since then I was able to have lab add on the additional stuff that was supposed to have been done and have resulted already and commented on it

## 2024-12-17 NOTE — TELEPHONE ENCOUNTER
-Kidney function is normal (Cr, GFR), Sodium is normal, Potassium is normal, Glucose is normal.   Ionized calcium is normal.  Serum calcium is low,  Vitamin D is very low and parathyroid is elevated may be because of a low vitamin D.  Encouraged taking vitamin D 5000 units daily and getting calcium through your diet and rechecking vitamin D parathyroid and calcium in 3 months and a lab only appointment if these persist and being out of range then may benefit from discussing with endocrinology you have an appointment with them in March.  -TSH (thyroid stimulating hormone) level is normal which indicates appropriate thyroid replacement dosing.  ADVISE: continuing same replacement dose and rechecking this in 3 months.  Levothyroxine 137 mcg failed.  Further recommendations to come when you see endocrine in March

## 2024-12-17 NOTE — TELEPHONE ENCOUNTER
Dr. Amato - it looks like these other labs have now resulted. Please let us know if we still need to connect with the lab on anything.    ----- Message from Genna Amato sent at 12/16/2024  4:17 PM CST -----  Please make sure lab gets all her labs done only partial of the future labs that were ordered looks like a done today    Hello -    Here are my comments about your recent results:  Hemoglobin is looking better no longer anemic.  Awaiting rest of labs to come back.  I see they only did the anemia labs and not the thyroid labs or the calcium and I am going to connect with lab to see what happened there      Please let us know if you have any questions or concerns.      Regards,  Genna Amato MD

## 2024-12-18 LAB
PATH REPORT.COMMENTS IMP SPEC: NORMAL
PATH REPORT.FINAL DX SPEC: NORMAL
PATH REPORT.MICROSCOPIC SPEC OTHER STN: NORMAL
PATH REPORT.MICROSCOPIC SPEC OTHER STN: NORMAL
PATH REPORT.RELEVANT HX SPEC: NORMAL

## 2024-12-18 NOTE — RESULT ENCOUNTER NOTE
Hello -    Here are my comments about your recent results:  Peripheral blood smear is normal.     Please let us know if you have any questions or concerns.     Regards,  Genna Amato MD

## 2025-03-26 ENCOUNTER — VIRTUAL VISIT (OUTPATIENT)
Dept: ENDOCRINOLOGY | Facility: CLINIC | Age: 30
End: 2025-03-26
Attending: FAMILY MEDICINE
Payer: COMMERCIAL

## 2025-03-26 DIAGNOSIS — E89.0 POSTABLATIVE HYPOTHYROIDISM: Primary | ICD-10-CM

## 2025-03-26 RX ORDER — LEVOTHYROXINE SODIUM 137 UG/1
137 TABLET ORAL DAILY
Qty: 90 TABLET | Refills: 4 | Status: SHIPPED | OUTPATIENT
Start: 2025-03-26

## 2025-03-26 ASSESSMENT — PAIN SCALES - GENERAL: PAINLEVEL_OUTOF10: NO PAIN (0)

## 2025-03-26 NOTE — NURSING NOTE
Current patient location: [address] [place] [address]    Is the patient currently in the state of [place]? YES    Visit mode: VIDEO    If the visit is dropped, the patient can be reconnected by:VIDEO VISIT: Text to cell phone:   Telephone Information:   Mobile [phone]       Will anyone else be joining the visit? NO  (If patient encounters technical issues they should call [phone]    Are changes needed to the allergy or medication list? No    Are refills needed on medications prescribed by this physician? YES    Rooming Documentation:  Questionnaire(s) completed    Reason for visit: Consult    Shayy DUVAL
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
## 2025-03-26 NOTE — PATIENT INSTRUCTIONS
Your thyroid blood work results are within the appropriate range. Please continue your current levothyroxine dose.  Important medication reminders:    Take levothyroxine on an empty stomach  Wait at least 30 minutes before eating  Avoid supplements or multivitamins containing iron and calcium within 4 hours of taking levothyroxine    Since your thyroid levels are stable, you have the option to follow up with your primary care physician for ongoing hypothyroidism management. Based on our discussion, I've placed an order indicating transition to primary care. I'll continue monitoring your thyroid lab results over the next year and have scheduled TSH labs every 6 months.  I understand you don't have pregnancy plans at this time. Should your plans change or if you become pregnant, please contact us promptly as your thyroid medication will need adjustment. If you discover you're pregnant before being able to reach us, please temporarily increase your dosage by taking double your regular dose twice weekly until we can speak.    Orders Placed This Encounter   Procedures    TSH with free T4 reflex    Graduation to Primary Care Referral

## 2025-03-26 NOTE — LETTER
3/26/2025       RE: Kenia Cook  [address]  [place] [address]     Dear Colleague,    Thank you for referring your patient, Kenia Cook, to the [facility] [place] [facility]. Please see a copy of my visit note below.    Endocrinology Clinic Visit    Chief Complaint: Consult     Information obtained from:Patient      Assessment/Treatment Plan:    Postablative hypothyroidism    Your thyroid blood work results are within the appropriate range. Please continue your current levothyroxine dose.  Important medication reminders:    Take levothyroxine on an empty stomach  Wait at least 30 minutes before eating  Avoid supplements or multivitamins containing iron and calcium within 4 hours of taking levothyroxine    Since your thyroid levels are stable, you have the option to follow up with your primary care physician for ongoing hypothyroidism management. Based on our discussion, I've placed an order indicating transition to primary care. I'll continue monitoring your thyroid lab results over the next year and have scheduled TSH labs every 6 months.  I understand you don't have pregnancy plans at this time. Should your plans change or if you become pregnant, please contact us promptly as your thyroid medication will need adjustment. If you discover you're pregnant before being able to reach us, please temporarily increase your dosage by taking double your regular dose twice weekly until we can speak.      Test and/or medications prescribed today:  Orders Placed This Encounter   Procedures     TSH with free T4 reflex     Graduation to Primary Care Referral         Cydney Winkler MD  Staff Endocrinologist    Division of Endocrinology and Diabetes      Subjective:         HPI: Kenia Cook is a 29 year old female with history of postablative hypothyroidism who is seen in consultation at Genna Amato's request for the  same.  History of hyperthyroidism and I-131 therapy in 2013.  Has been on levothyroxine replacement therapy since then.  Had TSH elevated in September 2024 which initiated a referral [facility].  Since then her primary care physician has already adjusted her thyroid medication and she currently takes 137 mcg daily.  Takes it on empty stomach and waits 30 minutes before eating.  She does not take any multivitamins within 4 hours of taking the medication.  She has no concerns and most recent TSH was within the range in December 2024.  No plans for pregnancy right now.  No other past medical history.  She is happy to follow-up with her primary care physician going forward for thyroid disorder.     No Known Allergies    Current Outpatient Medications   Medication Sig Dispense Refill     levothyroxine (SYNTHROID/LEVOTHROID) 137 MCG tablet Take 1 tablet (137 mcg) by mouth daily. 90 tablet 4       Review of Systems     11 point review system (Constitutional, HENT, Eyes, Respiratory, Cardiovascular, Gastrointestinal, Genitourinary, Musculoskeletal,Neurological, Psychiatric/Behavioural, Endocrine) is negative or is as per HPI above    Past Medical History:   Diagnosis Date     Hx of Graves' disease 2012    Low TSH and nuclear updtake scan 3/15/12 c/w Graves.  Received 13 mCi of I-131 ablation on 11/20/2013. On synthroid since that time.     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   LSIL on pap with HPV negative 3/2020 currently pregnant, needs colposcopy 6 wks pp         History reviewed. No pertinent surgical history.    Family History   Problem Relation Age of Onset     Diabetes Mother      Cerebrovascular Disease Father         x 2, 2015     Thyroid Disease Sister         hx of graves     No Known Problems Maternal  Grandmother      No Known Problems Maternal Grandfather      Hypertension Paternal Grandmother      Diabetes Paternal Grandmother      No Known Problems Paternal Grandfather        Social History     Socioeconomic History     Marital status: Single     Spouse name: None     Number of children: None     Years of education: None     Highest education level: None   Tobacco Use     Smoking status: Former     Current packs/day: 0.00     Types: Cigarettes     Start date: 2013     Quit date: 2019     Years since quittin.2     Smokeless tobacco: Never     Tobacco comments:     1 pack a week for 6 yrs,    Vaping Use     Vaping status: Never Used   Substance and Sexual Activity     Alcohol use: Not Currently     Drug use: Never     Sexual activity: Yes     Partners: Male     Birth control/protection: I.U.D.   Other Topics Concern     Parent/sibling w/ CABG, MI or angioplasty before 65F 55M? No   Social History Narrative    2024: lives with  & 3 kids ages 12 , 7 & 4 yrs, no pets. Is an  for [employer]    Works remotely         Works for [employer], from home currently.   - Ivan Valderrama. 8yo and 10yo kids.   No smoking or alcohol.  Kenia Valdovinos MD       Social Drivers of Health     Financial Resource Strain: Low Risk  (2024)    Financial Resource Strain      Within the past 12 months, have you or your family members you live with been unable to get utilities (heat, electricity) when it was really needed?: No   Food Insecurity: Low Risk  (2024)    Food Insecurity      Within the past 12 months, did you worry that your food would run out before you got money to buy more?: No      Within the past 12 months, did the food you bought just not last and you didn t have money to get more?: No   Transportation Needs: Low Risk  (2024)    Transportation Needs      Within the past 12 months, has lack of transportation kept you from medical appointments, getting  your medicines, non-medical meetings or appointments, work, or from getting things that you need?: No   Physical Activity: Insufficiently Active (9/23/2024)    Exercise Vital Sign      Days of Exercise per Week: 4 days      Minutes of Exercise per Session: 30 min   Stress: No Stress Concern Present (9/23/2024)    [place] [employer] - Occupational Stress Questionnaire      Feeling of Stress : Only a little   Social Connections: Unknown (9/23/2024)    Social Connection and Isolation Panel [NHANES]      Frequency of Social Gatherings with Friends and Family: Once a week   Interpersonal Safety: Low Risk  (9/23/2024)    Interpersonal Safety      Do you feel physically and emotionally safe where you currently live?: Yes      Within the past 12 months, have you been hit, slapped, kicked or otherwise physically hurt by someone?: No      Within the past 12 months, have you been humiliated or emotionally abused in other ways by your partner or ex-partner?: No   Housing Stability: Low Risk  (9/23/2024)    Housing Stability      Do you have housing? : Yes      Are you worried about losing your housing?: No       Objective:   There were no vitals taken for this visit.  GENERAL: alert and no distress  EYES: Eyes grossly normal to inspection.  No discharge or erythema, or obvious scleral/conjunctival abnormalities.  RESP: No audible wheeze, cough, or visible cyanosis.    SKIN: Visible skin clear. No significant rash, abnormal pigmentation or lesions.  NEURO: Cranial nerves grossly intact.  Mentation and speech appropriate for age.  PSYCH: Appropriate affect, tone, and pace of words    In House Labs:   Lab Results   Component Value Date    A1C 5.2 09/23/2024       TSH   Date Value Ref Range Status   12/16/2024 2.96 0.30 - 4.20 uIU/mL Final   09/23/2024 6.07 (H) 0.30 - 4.20 uIU/mL Final   09/29/2022 1.17 0.30 - 4.20 uIU/mL Final   07/16/2020 0.77 0.30 - 5.00 uIU/mL Final   06/11/2020 1.82 0.30 - 5.00 uIU/mL  Final   05/12/2020 0.99 0.30 - 5.00 uIU/mL Final   03/09/2020 17.60 (H) 0.30 - 5.00 uIU/mL Final   01/03/2020 1.29 0.30 - 5.00 uIU/mL Final     Free T4   Date Value Ref Range Status   09/23/2024 1.28 0.90 - 1.70 ng/dL Final   09/29/2022 1.65 0.90 - 1.70 ng/dL Final       Creatinine   Date Value Ref Range Status   12/16/2024 0.69 0.51 - 0.95 mg/dL Final   ]    This note has been dictated using voice recognition software.  As a result, there may be errors in the documentation that have gone undetected.  Please consider this when interpreting information in this documentation.    Video-Visit Details    Type of service:  Video Visit  Video Start Time: 11:03 AM  Video End Time:11:16 AM  Distant Location (provider location):  Off-site.   Platform used for Video Visit: Miri        Again, thank you for allowing me to participate in the care of your patient.      Sincerely,    Cydney Winkler MD